# Patient Record
Sex: MALE | Race: WHITE | Employment: OTHER | URBAN - METROPOLITAN AREA
[De-identification: names, ages, dates, MRNs, and addresses within clinical notes are randomized per-mention and may not be internally consistent; named-entity substitution may affect disease eponyms.]

---

## 2017-01-01 ENCOUNTER — TRANSFERRED RECORDS (OUTPATIENT)
Dept: HEALTH INFORMATION MANAGEMENT | Facility: CLINIC | Age: 82
End: 2017-01-01

## 2017-01-01 ENCOUNTER — NURSE TRIAGE (OUTPATIENT)
Dept: NURSING | Facility: CLINIC | Age: 82
End: 2017-01-01

## 2017-01-01 ENCOUNTER — OFFICE VISIT (OUTPATIENT)
Dept: FAMILY MEDICINE | Facility: CLINIC | Age: 82
End: 2017-01-01
Payer: COMMERCIAL

## 2017-01-01 VITALS
SYSTOLIC BLOOD PRESSURE: 139 MMHG | RESPIRATION RATE: 22 BRPM | HEART RATE: 69 BPM | TEMPERATURE: 97.5 F | OXYGEN SATURATION: 98 % | DIASTOLIC BLOOD PRESSURE: 82 MMHG

## 2017-01-01 VITALS
HEART RATE: 83 BPM | OXYGEN SATURATION: 100 % | DIASTOLIC BLOOD PRESSURE: 84 MMHG | RESPIRATION RATE: 12 BRPM | BODY MASS INDEX: 24.17 KG/M2 | TEMPERATURE: 98.7 F | SYSTOLIC BLOOD PRESSURE: 136 MMHG | WEIGHT: 152 LBS

## 2017-01-01 DIAGNOSIS — C61 MALIGNANT NEOPLASM OF PROSTATE (H): Primary | ICD-10-CM

## 2017-01-01 DIAGNOSIS — I50.42 CHRONIC COMBINED SYSTOLIC AND DIASTOLIC CONGESTIVE HEART FAILURE (H): ICD-10-CM

## 2017-01-01 DIAGNOSIS — C61 MALIGNANT NEOPLASM OF PROSTATE (H): ICD-10-CM

## 2017-01-01 DIAGNOSIS — I50.9 CONGESTIVE HEART FAILURE, UNSPECIFIED CONGESTIVE HEART FAILURE CHRONICITY, UNSPECIFIED CONGESTIVE HEART FAILURE TYPE: ICD-10-CM

## 2017-01-01 DIAGNOSIS — N18.30 ANEMIA IN STAGE 3 CHRONIC KIDNEY DISEASE (H): ICD-10-CM

## 2017-01-01 DIAGNOSIS — Z23 NEED FOR PROPHYLACTIC VACCINATION AND INOCULATION AGAINST INFLUENZA: Primary | ICD-10-CM

## 2017-01-01 DIAGNOSIS — D63.1 ANEMIA IN STAGE 3 CHRONIC KIDNEY DISEASE (H): ICD-10-CM

## 2017-01-01 DIAGNOSIS — Z23 NEED FOR VACCINATION: ICD-10-CM

## 2017-01-01 DIAGNOSIS — N18.30 CKD (CHRONIC KIDNEY DISEASE) STAGE 3, GFR 30-59 ML/MIN (H): Primary | ICD-10-CM

## 2017-01-01 DIAGNOSIS — I10 BENIGN ESSENTIAL HYPERTENSION: ICD-10-CM

## 2017-01-01 LAB
ANION GAP SERPL CALCULATED.3IONS-SCNC: 7 MMOL/L (ref 3–14)
BUN SERPL-MCNC: 35 MG/DL (ref 7–30)
CALCIUM SERPL-MCNC: 8.8 MG/DL (ref 8.5–10.1)
CHLORIDE SERPL-SCNC: 105 MMOL/L (ref 94–109)
CO2 SERPL-SCNC: 28 MMOL/L (ref 20–32)
CREAT SERPL-MCNC: 1.54 MG/DL (ref 0.66–1.25)
CREAT UR-MCNC: 67 MG/DL
DEPRECATED CALCIDIOL+CALCIFEROL SERPL-MC: 43 UG/L (ref 20–75)
ERYTHROCYTE [DISTWIDTH] IN BLOOD BY AUTOMATED COUNT: 12.8 % (ref 10–15)
GFR SERPL CREATININE-BSD FRML MDRD: 42 ML/MIN/1.7M2
GLUCOSE SERPL-MCNC: 94 MG/DL (ref 70–99)
HCT VFR BLD AUTO: 35.4 % (ref 40–53)
HGB BLD-MCNC: 11.2 G/DL (ref 13.3–17.7)
MCH RBC QN AUTO: 31.2 PG (ref 26.5–33)
MCHC RBC AUTO-ENTMCNC: 31.6 G/DL (ref 31.5–36.5)
MCV RBC AUTO: 99 FL (ref 78–100)
MICROALBUMIN UR-MCNC: 7 MG/L
MICROALBUMIN/CREAT UR: 9.78 MG/G CR (ref 0–17)
PLATELET # BLD AUTO: 245 10E9/L (ref 150–450)
POTASSIUM SERPL-SCNC: 4.7 MMOL/L (ref 3.4–5.3)
PSA SERPL-MCNC: 0.1 UG/L (ref 0–4)
PSA SERPL-MCNC: 0.14 UG/L (ref 0–4)
RBC # BLD AUTO: 3.59 10E12/L (ref 4.4–5.9)
SODIUM SERPL-SCNC: 140 MMOL/L (ref 133–144)
WBC # BLD AUTO: 8.3 10E9/L (ref 4–11)

## 2017-01-01 PROCEDURE — 82043 UR ALBUMIN QUANTITATIVE: CPT | Performed by: FAMILY MEDICINE

## 2017-01-01 PROCEDURE — 90670 PCV13 VACCINE IM: CPT | Performed by: FAMILY MEDICINE

## 2017-01-01 PROCEDURE — 85027 COMPLETE CBC AUTOMATED: CPT | Performed by: FAMILY MEDICINE

## 2017-01-01 PROCEDURE — 99214 OFFICE O/P EST MOD 30 MIN: CPT | Mod: 25 | Performed by: FAMILY MEDICINE

## 2017-01-01 PROCEDURE — 82306 VITAMIN D 25 HYDROXY: CPT | Performed by: FAMILY MEDICINE

## 2017-01-01 PROCEDURE — 90471 IMMUNIZATION ADMIN: CPT | Performed by: FAMILY MEDICINE

## 2017-01-01 PROCEDURE — 80048 BASIC METABOLIC PNL TOTAL CA: CPT | Performed by: FAMILY MEDICINE

## 2017-01-01 PROCEDURE — 90662 IIV NO PRSV INCREASED AG IM: CPT | Performed by: FAMILY MEDICINE

## 2017-01-01 PROCEDURE — 36415 COLL VENOUS BLD VENIPUNCTURE: CPT | Performed by: UROLOGY

## 2017-01-01 PROCEDURE — 36415 COLL VENOUS BLD VENIPUNCTURE: CPT | Performed by: FAMILY MEDICINE

## 2017-01-01 PROCEDURE — 84153 ASSAY OF PSA TOTAL: CPT | Performed by: UROLOGY

## 2017-01-01 PROCEDURE — 90715 TDAP VACCINE 7 YRS/> IM: CPT | Performed by: FAMILY MEDICINE

## 2017-01-01 PROCEDURE — 99213 OFFICE O/P EST LOW 20 MIN: CPT | Mod: 25 | Performed by: FAMILY MEDICINE

## 2017-01-01 RX ORDER — METOPROLOL TARTRATE 25 MG/1
12.5 TABLET, FILM COATED ORAL 2 TIMES DAILY
Qty: 180 TABLET | Refills: 2 | COMMUNITY
Start: 2017-01-01 | End: 2017-01-01

## 2017-01-01 RX ORDER — METOPROLOL TARTRATE 25 MG/1
TABLET, FILM COATED ORAL
Qty: 180 TABLET | Refills: 2 | Status: SHIPPED | OUTPATIENT
Start: 2017-01-01 | End: 2017-01-01

## 2017-01-01 RX ORDER — METOPROLOL TARTRATE 25 MG/1
12.5 TABLET, FILM COATED ORAL 2 TIMES DAILY
Qty: 90 TABLET | Refills: 3 | Status: ON HOLD | OUTPATIENT
Start: 2017-01-01 | End: 2018-01-01

## 2017-01-01 ASSESSMENT — PAIN SCALES - GENERAL
PAINLEVEL: NO PAIN (0)
PAINLEVEL: NO PAIN (0)

## 2017-03-01 ENCOUNTER — OFFICE VISIT (OUTPATIENT)
Dept: FAMILY MEDICINE | Facility: CLINIC | Age: 82
End: 2017-03-01
Payer: COMMERCIAL

## 2017-03-01 VITALS
TEMPERATURE: 97.6 F | RESPIRATION RATE: 20 BRPM | OXYGEN SATURATION: 99 % | WEIGHT: 154.7 LBS | BODY MASS INDEX: 24.6 KG/M2 | DIASTOLIC BLOOD PRESSURE: 78 MMHG | HEART RATE: 75 BPM | SYSTOLIC BLOOD PRESSURE: 132 MMHG

## 2017-03-01 DIAGNOSIS — C61 MALIGNANT NEOPLASM OF PROSTATE (H): ICD-10-CM

## 2017-03-01 DIAGNOSIS — I25.10 CORONARY ARTERY DISEASE INVOLVING NATIVE CORONARY ARTERY OF NATIVE HEART WITHOUT ANGINA PECTORIS: Primary | ICD-10-CM

## 2017-03-01 DIAGNOSIS — I34.0 MITRAL VALVE INSUFFICIENCY, UNSPECIFIED ETIOLOGY: ICD-10-CM

## 2017-03-01 DIAGNOSIS — I50.42 CHRONIC COMBINED SYSTOLIC AND DIASTOLIC CONGESTIVE HEART FAILURE (H): ICD-10-CM

## 2017-03-01 LAB — PSA SERPL-MCNC: 0.11 UG/L (ref 0–4)

## 2017-03-01 PROCEDURE — 36415 COLL VENOUS BLD VENIPUNCTURE: CPT | Performed by: UROLOGY

## 2017-03-01 PROCEDURE — 99214 OFFICE O/P EST MOD 30 MIN: CPT | Performed by: FAMILY MEDICINE

## 2017-03-01 PROCEDURE — 84153 ASSAY OF PSA TOTAL: CPT | Performed by: UROLOGY

## 2017-03-01 ASSESSMENT — PAIN SCALES - GENERAL: PAINLEVEL: NO PAIN (0)

## 2017-03-01 NOTE — NURSING NOTE
"Chief Complaint   Patient presents with     Hypertension     recheck.        Initial /78 (BP Location: Right arm, Patient Position: Chair, Cuff Size: Adult Regular)  Pulse 75  Temp 97.6  F (36.4  C) (Temporal)  Resp 20  Wt 154 lb 11.2 oz (70.2 kg)  SpO2 99%  BMI 24.6 kg/m2 Estimated body mass index is 24.6 kg/(m^2) as calculated from the following:    Height as of 7/22/16: 5' 6.5\" (1.689 m).    Weight as of this encounter: 154 lb 11.2 oz (70.2 kg).  Medication Reconciliation: complete   Loretta Mina CMA     "

## 2017-03-01 NOTE — MR AVS SNAPSHOT
"              After Visit Summary   3/1/2017    Tristen Canela    MRN: [id number]           Patient Information     Date Of Birth          1/20/1925        Visit Information        Provider Department      3/1/2017 1:00 PM Luis Miguel Watson MD [facility]        Today's Diagnoses     Coronary artery disease involving native coronary artery of native heart without angina pectoris    -  1    Chronic combined systolic and diastolic congestive heart failure (H)        Mitral valve insufficiency, unspecified etiology           Follow-ups after your visit        Your next 10 appointments already scheduled     Jul 05, 2017  1:00 PM CDT   SHORT with Luis Miguel Watson MD   [facility] ([facility]    [address] [place] [id number] [phone]              Who to contact     If you have questions or need follow up information about today's clinic visit or your schedule please contact [facility] directly at [phone].  Normal or non-critical lab and imaging results will be communicated to you by MyChart, letter or phone within 4 business days after the clinic has received the results. If you do not hear from us within 7 days, please contact the clinic through [employer] or phone. If you have a critical or abnormal lab result, we will notify you by phone as soon as possible.  Submit refill requests through [employer] or call your pharmacy and they will forward the refill request to us. Please allow 3 business days for your refill to be completed.          Additional Information About Your Visit        [employer] Education Information     [employer] lets you send messages to your doctor, view your test results, renew your prescriptions, schedule appointments and more. To sign up, go to [email or web address] . Click on \"Log in\" on the left side of the screen, which will take you to the Welcome page. Then click on \"Sign up Now\" on the right side of the " page.     You will be asked to enter the access code listed below, as well as some personal information. Please follow the directions to create your username and password.     Your access code is: [email or web address]  Expires: 2017  4:43 PM     Your access code will  in 90 days. If you need help or a new code, please call your [place] clinic or [phone].        Care EveryWhere ID     This is your Care EveryWhere ID. This could be used by other organizations to access your [facility] medical records  [id number]        Your Vitals Were     Pulse Temperature Respirations Pulse Oximetry BMI (Body Mass Index)       75 97.6  F (36.4  C) (Temporal) 20 99% 24.6 kg/m2        Blood Pressure from Last 3 Encounters:   17 132/78   16 112/68   16 104/60    Weight from Last 3 Encounters:   17 154 lb 11.2 oz (70.2 kg)   16 152 lb 9.6 oz (69.2 kg)   16 150 lb (68 kg)              Today, you had the following     No orders found for display         Today's Medication Changes          These changes are accurate as of: 3/1/17  4:43 PM.  If you have any questions, ask your nurse or doctor.               These medicines have changed or have updated prescriptions.        Dose/Directions    aspirin 81 MG EC tablet   This may have changed:  See the new instructions.   Used for:  Coronary artery disease involving native coronary artery of native heart without angina pectoris   Changed by:  Luis Miguel Watson MD        Dose:  325 mg   Take 4 tablets (325 mg) by mouth daily   Quantity:  90 tablet   Refills:  1                Primary Care Provider Office Phone # Fax #    Luis Miguel Watson [phone]       [address] [facility] [place] [address]        Thank you!     Thank you for choosing [facility]  for your care. Our goal is always to provide you with excellent care. Hearing back from our patients is one way we can continue to improve  our services. Please take a few minutes to complete the written survey that you may receive in the mail after your visit with us. Thank you!             Your Updated Medication List - Protect others around you: Learn how to safely use, store and throw away your medicines at www.disposemymeds.org.          This list is accurate as of: 3/1/17  4:43 PM.  Always use your most recent med list.                   Brand Name Dispense Instructions for use    aspirin 81 MG EC tablet     90 tablet    Take 4 tablets (325 mg) by mouth daily       lisinopril 5 MG tablet    PRINIVIL/ZESTRIL    45 tablet    Take 0.5 tablets (2.5 mg) by mouth daily       LUPRON 5 MG/ML Soln   Generic drug:  Leuprolide Acetate     1 mL    1 SHOT PER DR LARA EVERY FOUR MONTHS FOR PROSTATE CANCER       metoprolol 25 MG tablet    LOPRESSOR    180 tablet    Take 0.5 tablets (12.5 mg) by mouth 2 times daily       Multi-vitamin Tabs tablet   Generic drug:  multivitamin, therapeutic with minerals      Take 1 tablet by mouth daily.

## 2017-03-01 NOTE — PROGRESS NOTES
SUBJECTIVE:                                                    Tristen Canela is a 89 year old male who presents to clinic today for the following health issues:      Hypertension Follow-up      Outpatient blood pressures are not being checked.    Low Salt Diet: no added salt       Amount of exercise or physical activity: 6-7 days/week for an average of less than 15 minutes    Problems taking medications regularly: No    Medication side effects: none  Diet: low salt    CHF-continue to watch weights daily and reduce his sodium intake. Doing well. No functional decline. Still up and moving about without limitation, except for his age and chronic disability. He uses a walker mainly.    CAD-no chest pain. Compliant with medication regimen        Problem list and histories reviewed & adjusted, as indicated.  Additional history: as documented        Reviewed and updated as needed this visit by clinical staff  Tobacco  Allergies  Med Hx  Surg Hx  Fam Hx  Soc Hx      Reviewed and updated as needed this visit by Provider         ROS:  Constitutional, HEENT, cardiovascular, pulmonary, gi and gu systems are negative, except as otherwise noted.    OBJECTIVE:                                                    /78 (BP Location: Right arm, Patient Position: Chair, Cuff Size: Adult Regular)  Pulse 75  Temp 97.6  F (36.4  C) (Temporal)  Resp 20  Wt 154 lb 11.2 oz (70.2 kg)  SpO2 99%  BMI 24.6 kg/m2  Body mass index is 24.6 kg/(m^2).  GENERAL: healthy, alert and no distress  NECK: no adenopathy, no asymmetry, masses, or scars and thyroid normal to palpation  RESP: lungs clear to auscultation - no rales, rhonchi or wheezes  CV: regular rate and rhythm, normal S1 S2, no S3 or S4, 3/6 systolic flow murmur, click or rub, no peripheral edema and peripheral pulses strong  ABDOMEN: soft, nontender, no hepatosplenomegaly, no masses and bowel sounds normal  MS: no gross musculoskeletal defects noted, no edema    Diagnostic  Test Results:  none      ASSESSMENT/PLAN:                                                              ICD-10-CM    1. Coronary artery disease involving native coronary artery of native heart without angina pectoris I25.10 aspirin EC 81 MG EC tablet   2. Chronic combined systolic and diastolic congestive heart failure (H) I50.42    3. Mitral valve insufficiency, unspecified etiology I34.0        Euvolemic today. No signs of heart failure exacerbation. No signs of decline. Still maintaining good functioning for his age. Continue with beta blocker, Ace, aspirin. Declines cholesterol treatment. At his age, the benefits are questionable regarding this. Continue to follow his mitral valve symptomatically. Do not wish aggressive interventions for this.     Luis Miguel Watson MD  [facility]

## 2017-03-06 ENCOUNTER — TRANSFERRED RECORDS (OUTPATIENT)
Dept: HEALTH INFORMATION MANAGEMENT | Facility: CLINIC | Age: 82
End: 2017-03-06

## 2017-03-06 DIAGNOSIS — C61 MALIGNANT NEOPLASM OF PROSTATE (H): Primary | ICD-10-CM

## 2017-07-12 NOTE — PROGRESS NOTES
SUBJECTIVE:                                                    Tristen Canela is a 89 year old male who presents to clinic today for the following health issues:    Chief Complaint   Patient presents with     Hypertension        Hypertension Follow-up      Outpatient blood pressures are being checked at home.  Results are 140/80.    Low Salt Diet: no added salt      Amount of exercise or physical activity: 2-3 days/week for an average of 30-45 minutes    Problems taking medications regularly: No    Medication side effects: none    Diet: low salt    Return to check chronic disease. Has been doing well. Still remains quite active mowing lawns. Family is here and agrees. Avoids NSAIDs. Has chronic kidney disease. No evidence of worsening heart failure. Decent activity tolerance for age.    ROS:  Constitutional, HEENT, cardiovascular, pulmonary, gi and gu systems are negative, except as otherwise noted.    OBJECTIVE:                                                    /82 (BP Location: Right arm, Patient Position: Chair, Cuff Size: Adult Regular)  Pulse 69  Temp 97.5  F (36.4  C) (Temporal)  Resp 22  SpO2 98%  There is no height or weight on file to calculate BMI.    GENERAL: healthy, alert and no distress  NECK: no adenopathy, no asymmetry, masses, or scars and thyroid normal to palpation  RESP: lungs clear to auscultation - no rales, rhonchi or wheezes  CV: regular rate and rhythm, normal S1 S2, no S3 or S4, 3/6 systolic flow murmur, click or rub, no peripheral edema and peripheral pulses strong  ABDOMEN: soft, nontender, no hepatosplenomegaly, no masses and bowel sounds normal  MS: no gross musculoskeletal defects noted, no edema       Diagnostic Test Results:  none      ASSESSMENT/PLAN:                                                        ICD-10-CM    1. CKD (chronic kidney disease) stage 3, GFR 30-59 ml/min N18.3 Basic metabolic panel     Albumin Random Urine Quantitative     CBC with platelets      Vitamin D Deficiency   2. Chronic combined systolic and diastolic congestive heart failure (H) I50.42    3. Benign essential hypertension I10    4. Anemia in stage 3 chronic kidney disease N18.3     D63.1    5. Need for vaccination Z23 TDAP VACCINE (ADACEL) [54653.002]     Pneumococcal vaccine 13 valent PCV13 IM (Prevnar) [72628]     ADMIN: Vaccine, Initial (82901)       Stable chronic diseases. Recheck baseline labs. No changes today made to his medication regimen. Update of vaccinations. See him back in 4 months.    Luis Miguel Watson MD  [facility]

## 2017-07-12 NOTE — MR AVS SNAPSHOT
"              After Visit Summary   7/12/2017    Tristen Canela    MRN: [id number]           Patient Information     Date Of Birth          1/20/1925        Visit Information        Provider Department      7/12/2017 9:40 AM Luis Miguel Watson MD [facility]        Today's Diagnoses     CKD (chronic kidney disease) stage 3, GFR 30-59 ml/min    -  1    Chronic combined systolic and diastolic congestive heart failure (H)        Benign essential hypertension        Anemia in stage 3 chronic kidney disease        Need for vaccination           Follow-ups after your visit        Your next 10 appointments already scheduled     Nov 07, 2017  9:40 AM CST   SHORT with Luis Miguel Watson MD   [facility] ([facility]    [address] [place] [address] [phone]              Who to contact     If you have questions or need follow up information about today's clinic visit or your schedule please contact [facility] directly at [phone].  Normal or non-critical lab and imaging results will be communicated to you by MyChart, letter or phone within 4 business days after the clinic has received the results. If you do not hear from us within 7 days, please contact the clinic through [employer] or phone. If you have a critical or abnormal lab result, we will notify you by phone as soon as possible.  Submit refill requests through [employer] or call your pharmacy and they will forward the refill request to us. Please allow 3 business days for your refill to be completed.          Additional Information About Your Visit        [employer] Information     [employer] lets you send messages to your doctor, view your test results, renew your prescriptions, schedule appointments and more. To sign up, go to [email or web address] . Click on \"Log in\" on the left side of the screen, which will take you to the Welcome page. Then click on \"Sign up Now\" on the right " side of the page.     You will be asked to enter the access code listed below, as well as some personal information. Please follow the directions to create your username and password.     Your access code is: [email or web address]  Expires: 10/10/2017  5:30 PM     Your access code will  in 90 days. If you need help or a new code, please call your [place] clinic or [phone].        Care EveryWhere ID     This is your Care EveryWhere ID. This could be used by other organizations to access your [facility] medical records  [id number]        Your Vitals Were     Pulse Temperature Respirations Pulse Oximetry          69 97.5  F (36.4  C) (Temporal) 22 98%         Blood Pressure from Last 3 Encounters:   17 139/82   17 132/78   16 112/68    Weight from Last 3 Encounters:   17 154 lb 11.2 oz (70.2 kg)   16 152 lb 9.6 oz (69.2 kg)   16 150 lb (68 kg)              We Performed the Following     ADMIN: Vaccine, Initial (28721)     Albumin Random Urine Quantitative     Basic metabolic panel     CBC with platelets     Pneumococcal vaccine 13 valent PCV13 IM (Prevnar) [90354]     TDAP VACCINE (ADACEL) [60380.002]     Vitamin D Deficiency        Primary Care Provider Office Phone # Fax #    Robbjohana Franck Watson [phone]       [address]   [facility] [address]        Equal Access to Services     ANG MAYS AH: Hadii aad ku hadasho Soomaali, waaxda luqadaha, qaybta kaalmada adeegyada, lloyd corcoran. So [facility] [phone].    ATENCIÓN: Si habla español, tiene a frank disposición servicios gratuitos de asistencia lingüística. Llame al [phone].    We comply with applicable federal civil rights laws and [place] laws. We do not discriminate on the basis of race, color, national origin, age, disability sex, sexual orientation or gender identity.            Thank you!     Thank you for choosing [facility]   for your care. Our goal is always to provide you with excellent care. Hearing back from our patients is one way we can continue to improve our services. Please take a few minutes to complete the written survey that you may receive in the mail after your visit with us. Thank you!             Your Updated Medication List - Protect others around you: Learn how to safely use, store and throw away your medicines at www.disposemymeds.org.          This list is accurate as of: 7/12/17  5:30 PM.  Always use your most recent med list.                   Brand Name Dispense Instructions for use Diagnosis    aspirin 81 MG EC tablet     90 tablet    Take 81 mg by mouth daily    Coronary artery disease involving native coronary artery of native heart without angina pectoris       lisinopril 5 MG tablet    PRINIVIL/ZESTRIL    45 tablet    TAKE ONE-HALF TABLET BY MOUTH ONCE DAILY    Benign essential hypertension       LUPRON 5 MG/ML Soln   Generic drug:  Leuprolide Acetate     1 mL    1 SHOT PER DR LARA EVERY FOUR MONTHS FOR PROSTATE CANCER    Prostate cancer (H)       metoprolol 25 MG tablet    LOPRESSOR    180 tablet    Take 0.5 tablets (12.5 mg) by mouth 2 times daily    Congestive heart failure, unspecified congestive heart failure chronicity, unspecified congestive heart failure type (H)       Multi-vitamin Tabs tablet   Generic drug:  multivitamin, therapeutic with minerals      Take 1 tablet by mouth daily.

## 2017-07-12 NOTE — NURSING NOTE
"Chief Complaint   Patient presents with     Hypertension       Initial /82 (BP Location: Right arm, Patient Position: Chair, Cuff Size: Adult Regular)  Pulse 69  Temp 97.5  F (36.4  C) (Temporal)  Resp 22  SpO2 98% Estimated body mass index is 24.6 kg/(m^2) as calculated from the following:    Height as of 7/22/16: 5' 6.5\" (1.689 m).    Weight as of 3/1/17: 154 lb 11.2 oz (70.2 kg).  Medication Reconciliation: complete   Loretta Mina CMA     "

## 2017-10-09 NOTE — TELEPHONE ENCOUNTER
metoprolol (LOPRESSOR) 25 MG tablet      Last Written Prescription Date: 6/17/16  Last Fill Quantity: 180, # refills: 3    Last Office Visit with [facility], [facility] or [facility] prescribing provider:  7/12/17   Future Office Visit:    Next 5 appointments (look out 90 days)     Nov 07, 2017  9:40 AM CST   SHORT with Luis Miguel Watson MD   [facility] ([facility])    [address] [place] [address] [phone]                    BP Readings from Last 3 Encounters:   07/12/17 139/82   03/01/17 132/78   11/25/16 112/68

## 2017-11-21 NOTE — NURSING NOTE
"Chief Complaint   Patient presents with     Hypertension     recheck       Initial /84  Pulse 83  Temp 98.7  F (37.1  C) (Tympanic)  Resp 12  Wt 152 lb (68.9 kg)  SpO2 100%  BMI 24.17 kg/m2 Estimated body mass index is 24.17 kg/(m^2) as calculated from the following:    Height as of 7/22/16: 5' 6.5\" (1.689 m).    Weight as of this encounter: 152 lb (68.9 kg).  Medication Reconciliation: complete    "

## 2017-11-21 NOTE — PROGRESS NOTES

## 2017-11-21 NOTE — PROGRESS NOTES
SUBJECTIVE:                                                    Tristen Canela is a 89 year old male who presents to clinic today for the following health issues:    Chief Complaint   Patient presents with     Hypertension     recheck        Hypertension Follow-up      Outpatient blood pressures are not being checked.    Low Salt Diet: not monitoring salt        Amount of exercise or physical activity: None    Problems taking medications regularly: No    Medication side effects: none    Diet: regular (no restrictions)          ROS:  Constitutional, HEENT, cardiovascular, pulmonary, gi and gu systems are negative, except as otherwise noted.      OBJECTIVE:                                                    /84  Pulse 83  Temp 98.7  F (37.1  C) (Tympanic)  Resp 12  Wt 68.9 kg (152 lb)  SpO2 100%  BMI 24.17 kg/m2  Body mass index is 24.17 kg/(m^2).    GENERAL: healthy, alert and no distress  NECK: no adenopathy, no asymmetry, masses, or scars and thyroid normal to palpation  RESP: lungs clear to auscultation - no rales, rhonchi or wheezes  CV: regular rate and rhythm, normal S1 S2, no S3 or S4, no murmur, click or rub, no peripheral edema and peripheral pulses strong  ABDOMEN: soft, nontender, no hepatosplenomegaly, no masses and bowel sounds normal  MS: no gross musculoskeletal defects noted, no edema    Diagnostic Test Results:  none      ASSESSMENT/PLAN:                                                        ICD-10-CM    1. Need for prophylactic vaccination and inoculation against influenza Z23 FLU VACCINE, INCREASED ANTIGEN, PRESV FREE, AGE 65+ [36606]     Vaccine Administration, Initial [56098]   2. Congestive heart failure, unspecified congestive heart failure chronicity, unspecified congestive heart failure type (H) I50.9 metoprolol (LOPRESSOR) 25 MG tablet     DISCONTINUED: metoprolol (LOPRESSOR) 25 MG tablet       Stable hypertension. No changes. Continue on beta blocker and Ace for his history  of hypertension or heart failure. Continue home monitoring and see me back in 3-4 months    Luis Miguel Watson MD  [facility]

## 2017-11-21 NOTE — MR AVS SNAPSHOT
"              After Visit Summary   2017    Tristen Canela    MRN: [id number]           Patient Information     Date Of Birth          1925        Visit Information        Provider Department      2017 1:00 PM Luis Miguel Watson MD [facility]        Today's Diagnoses     Need for prophylactic vaccination and inoculation against influenza    -  1    Congestive heart failure, unspecified congestive heart failure chronicity, unspecified congestive heart failure type (H)           Follow-ups after your visit        Who to contact     If you have questions or need follow up information about today's clinic visit or your schedule please contact [facility] directly at [phone].  Normal or non-critical lab and imaging results will be communicated to you by MyChart, letter or phone within 4 business days after the clinic has received the results. If you do not hear from us within 7 days, please contact the clinic through MyChart or phone. If you have a critical or abnormal lab result, we will notify you by phone as soon as possible.  Submit refill requests through PresenterNet or call your pharmacy and they will forward the refill request to us. Please allow 3 business days for your refill to be completed.          Additional Information About Your Visit        MyChart Information     PresenterNet lets you send messages to your doctor, view your test results, renew your prescriptions, schedule appointments and more. To sign up, go to [email or web address] . Click on \"Log in\" on the left side of the screen, which will take you to the Welcome page. Then click on \"Sign up Now\" on the right side of the page.     You will be asked to enter the access code listed below, as well as some personal information. Please follow the directions to create your username and password.     Your access code is: [email or web address]  Expires: 2018  4:22 PM     Your access code will  in 90 " days. If you need help or a new code, please call your [place] clinic or [phone].        Care EveryWhere ID     This is your Care EveryWhere ID. This could be used by other organizations to access your [place] medical records  [id number]        Your Vitals Were     Pulse Temperature Respirations Pulse Oximetry BMI (Body Mass Index)       83 98.7  F (37.1  C) (Tympanic) 12 100% 24.17 kg/m2        Blood Pressure from Last 3 Encounters:   11/21/17 136/84   07/12/17 139/82   03/01/17 132/78    Weight from Last 3 Encounters:   11/21/17 152 lb (68.9 kg)   03/01/17 154 lb 11.2 oz (70.2 kg)   11/25/16 152 lb 9.6 oz (69.2 kg)              We Performed the Following     FLU VACCINE, INCREASED ANTIGEN, PRESV FREE, AGE 65+ [52881]     Vaccine Administration, Initial [81611]          Today's Medication Changes          These changes are accurate as of: 11/21/17 11:59 PM.  If you have any questions, ask your nurse or doctor.               Start taking these medicines.        Dose/Directions    metoprolol 25 MG tablet   Commonly known as:  LOPRESSOR   Used for:  Congestive heart failure, unspecified congestive heart failure chronicity, unspecified congestive heart failure type (H)   Started by:  Luis Miguel Watson MD        Dose:  12.5 mg   Take 0.5 tablets (12.5 mg) by mouth 2 times daily   Quantity:  90 tablet   Refills:  3            Where to get your medicines      These medications were sent to [place] [address]  [address] [facility] [address]     Phone:  [phone]     metoprolol 25 MG tablet                Primary Care Provider Office Phone # Fax #    Luis Miguel Watson [phone]       [address] [place] [address]        Equal Access to Services     ANG MAYS AH: Dominic conti Socrystal, waaxda luqadaha, qaybta kaalmada adeegyakvng, lloyd corcoran. [facility] [phone].    ATENCIÓN: Si luis mcdonald  a frank disposición servicios gratuitos de asistencia lingüística. Simeon valdez [phone].    We comply with applicable federal civil rights laws and [place] laws. We do not discriminate on the basis of race, color, national origin, age, disability, sex, sexual orientation, or gender identity.            Thank you!     Thank you for choosing [facility]  for your care. Our goal is always to provide you with excellent care. Hearing back from our patients is one way we can continue to improve our services. Please take a few minutes to complete the written survey that you may receive in the mail after your visit with us. Thank you!             Your Updated Medication List - Protect others around you: Learn how to safely use, store and throw away your medicines at www.disposemymeds.org.          This list is accurate as of: 11/21/17 11:59 PM.  Always use your most recent med list.                   Brand Name Dispense Instructions for use Diagnosis    aspirin 81 MG EC tablet     90 tablet    Take 81 mg by mouth daily    Coronary artery disease involving native coronary artery of native heart without angina pectoris       lisinopril 5 MG tablet    PRINIVIL/ZESTRIL    45 tablet    TAKE ONE-HALF TABLET BY MOUTH ONCE DAILY    Benign essential hypertension       LUPRON 5 MG/ML Soln   Generic drug:  Leuprolide Acetate     1 mL    1 SHOT PER DR LARA EVERY FOUR MONTHS FOR PROSTATE CANCER    Prostate cancer (H)       metoprolol 25 MG tablet    LOPRESSOR    90 tablet    Take 0.5 tablets (12.5 mg) by mouth 2 times daily    Congestive heart failure, unspecified congestive heart failure chronicity, unspecified congestive heart failure type (H)       Multi-vitamin Tabs tablet   Generic drug:  multivitamin, therapeutic with minerals      Take 1 tablet by mouth daily.

## 2017-11-21 NOTE — NURSING NOTE
Prior to injection verified patient identity using patient's name and date of birth.   Patient instructed to remain in clinic for 20 minutes afterwards, and to report any adverse reaction to me immediately.  Tiffani Pitts MA

## 2017-11-24 NOTE — TELEPHONE ENCOUNTER
"\"He took my mother's medications by accident, she dispensed them, go them mixed up and he ended up taking them.  He took 20mg of Lisinopril, 30mg of Imdur, and a calcium tablet.  He normally only takes 12.5mg of Metoprolol.  His BP now is 111/65 and is not showing any symptoms.\"  Writer gave daughter the number for poison control and then transferred the call.    Reason for Disposition    Took another person's prescription drug    Protocols used: MEDICATION QUESTION CALL-ADULT-    "

## 2018-01-01 ENCOUNTER — PATIENT OUTREACH (OUTPATIENT)
Dept: CARE COORDINATION | Facility: CLINIC | Age: 83
End: 2018-01-01

## 2018-01-01 ENCOUNTER — ANESTHESIA EVENT (OUTPATIENT)
Dept: INTENSIVE CARE | Facility: CLINIC | Age: 83
DRG: 862 | End: 2018-01-01
Payer: MEDICARE

## 2018-01-01 ENCOUNTER — ANESTHESIA (OUTPATIENT)
Dept: SURGERY | Facility: CLINIC | Age: 83
DRG: 862 | End: 2018-01-01
Payer: MEDICARE

## 2018-01-01 ENCOUNTER — APPOINTMENT (OUTPATIENT)
Dept: GENERAL RADIOLOGY | Facility: CLINIC | Age: 83
DRG: 862 | End: 2018-01-01
Attending: HOSPITALIST
Payer: MEDICARE

## 2018-01-01 ENCOUNTER — OFFICE VISIT (OUTPATIENT)
Dept: ORTHOPEDICS | Facility: CLINIC | Age: 83
End: 2018-01-01
Payer: COMMERCIAL

## 2018-01-01 ENCOUNTER — ANESTHESIA EVENT (OUTPATIENT)
Dept: SURGERY | Facility: CLINIC | Age: 83
DRG: 862 | End: 2018-01-01
Payer: MEDICARE

## 2018-01-01 ENCOUNTER — HOSPITAL ENCOUNTER (OUTPATIENT)
Facility: CLINIC | Age: 83
Setting detail: OBSERVATION
Discharge: NURSING, REHAB OR LONG-TERM CARE | End: 2018-05-19
Attending: EMERGENCY MEDICINE | Admitting: INTERNAL MEDICINE
Payer: MEDICARE

## 2018-01-01 ENCOUNTER — INFUSION THERAPY VISIT (OUTPATIENT)
Dept: INFUSION THERAPY | Facility: CLINIC | Age: 83
End: 2018-01-01
Attending: FAMILY MEDICINE
Payer: MEDICARE

## 2018-01-01 ENCOUNTER — TRANSFERRED RECORDS (OUTPATIENT)
Dept: HEALTH INFORMATION MANAGEMENT | Facility: CLINIC | Age: 83
End: 2018-01-01

## 2018-01-01 ENCOUNTER — NURSING HOME VISIT (OUTPATIENT)
Dept: GERIATRICS | Facility: CLINIC | Age: 83
End: 2018-01-01
Payer: COMMERCIAL

## 2018-01-01 ENCOUNTER — HOSPITAL ENCOUNTER (INPATIENT)
Facility: CLINIC | Age: 83
LOS: 1 days | DRG: 862 | End: 2018-06-04
Attending: UROLOGY | Admitting: HOSPITALIST
Payer: MEDICARE

## 2018-01-01 ENCOUNTER — OFFICE VISIT (OUTPATIENT)
Dept: FAMILY MEDICINE | Facility: CLINIC | Age: 83
End: 2018-01-01
Payer: COMMERCIAL

## 2018-01-01 ENCOUNTER — APPOINTMENT (OUTPATIENT)
Dept: MRI IMAGING | Facility: CLINIC | Age: 83
End: 2018-01-01
Attending: ORTHOPAEDIC SURGERY
Payer: MEDICARE

## 2018-01-01 ENCOUNTER — APPOINTMENT (OUTPATIENT)
Dept: GENERAL RADIOLOGY | Facility: CLINIC | Age: 83
End: 2018-01-01
Attending: EMERGENCY MEDICINE
Payer: MEDICARE

## 2018-01-01 ENCOUNTER — HOSPITAL LABORATORY (OUTPATIENT)
Dept: NURSING HOME | Facility: OTHER | Age: 83
End: 2018-01-01

## 2018-01-01 ENCOUNTER — SURGERY (OUTPATIENT)
Age: 83
End: 2018-01-01

## 2018-01-01 ENCOUNTER — ANESTHESIA (OUTPATIENT)
Dept: INTENSIVE CARE | Facility: CLINIC | Age: 83
DRG: 862 | End: 2018-01-01
Payer: MEDICARE

## 2018-01-01 ENCOUNTER — APPOINTMENT (OUTPATIENT)
Dept: PHYSICAL THERAPY | Facility: CLINIC | Age: 83
End: 2018-01-01
Payer: MEDICARE

## 2018-01-01 ENCOUNTER — APPOINTMENT (OUTPATIENT)
Dept: PHYSICAL THERAPY | Facility: CLINIC | Age: 83
End: 2018-01-01
Attending: INTERNAL MEDICINE
Payer: MEDICARE

## 2018-01-01 ENCOUNTER — TELEPHONE (OUTPATIENT)
Dept: FAMILY MEDICINE | Facility: CLINIC | Age: 83
End: 2018-01-01

## 2018-01-01 ENCOUNTER — APPOINTMENT (OUTPATIENT)
Dept: GENERAL RADIOLOGY | Facility: CLINIC | Age: 83
DRG: 862 | End: 2018-01-01
Attending: INTERNAL MEDICINE
Payer: MEDICARE

## 2018-01-01 VITALS
RESPIRATION RATE: 16 BRPM | BODY MASS INDEX: 23.34 KG/M2 | HEIGHT: 67 IN | DIASTOLIC BLOOD PRESSURE: 62 MMHG | WEIGHT: 148.7 LBS | SYSTOLIC BLOOD PRESSURE: 110 MMHG | TEMPERATURE: 98.4 F | HEART RATE: 78 BPM

## 2018-01-01 VITALS
TEMPERATURE: 96.2 F | SYSTOLIC BLOOD PRESSURE: 130 MMHG | HEART RATE: 72 BPM | OXYGEN SATURATION: 98 % | DIASTOLIC BLOOD PRESSURE: 70 MMHG

## 2018-01-01 VITALS
SYSTOLIC BLOOD PRESSURE: 118 MMHG | WEIGHT: 146 LBS | HEART RATE: 128 BPM | DIASTOLIC BLOOD PRESSURE: 64 MMHG | RESPIRATION RATE: 18 BRPM | TEMPERATURE: 98.2 F | BODY MASS INDEX: 22.87 KG/M2

## 2018-01-01 VITALS
TEMPERATURE: 98.6 F | HEART RATE: 82 BPM | RESPIRATION RATE: 16 BRPM | BODY MASS INDEX: 22.93 KG/M2 | WEIGHT: 146.4 LBS | DIASTOLIC BLOOD PRESSURE: 54 MMHG | SYSTOLIC BLOOD PRESSURE: 96 MMHG

## 2018-01-01 VITALS
RESPIRATION RATE: 16 BRPM | WEIGHT: 147 LBS | TEMPERATURE: 99 F | DIASTOLIC BLOOD PRESSURE: 55 MMHG | BODY MASS INDEX: 23.02 KG/M2 | SYSTOLIC BLOOD PRESSURE: 98 MMHG | HEART RATE: 90 BPM

## 2018-01-01 VITALS
OXYGEN SATURATION: 96 % | SYSTOLIC BLOOD PRESSURE: 134 MMHG | DIASTOLIC BLOOD PRESSURE: 82 MMHG | HEART RATE: 71 BPM | TEMPERATURE: 98.6 F

## 2018-01-01 VITALS
OXYGEN SATURATION: 93 % | HEIGHT: 67 IN | WEIGHT: 146.61 LBS | DIASTOLIC BLOOD PRESSURE: 71 MMHG | RESPIRATION RATE: 18 BRPM | BODY MASS INDEX: 23.01 KG/M2 | HEART RATE: 99 BPM | TEMPERATURE: 98.5 F | SYSTOLIC BLOOD PRESSURE: 119 MMHG

## 2018-01-01 VITALS
RESPIRATION RATE: 28 BRPM | TEMPERATURE: 97.6 F | DIASTOLIC BLOOD PRESSURE: 63 MMHG | SYSTOLIC BLOOD PRESSURE: 97 MMHG | OXYGEN SATURATION: 28 % | HEART RATE: 109 BPM

## 2018-01-01 VITALS
TEMPERATURE: 97.6 F | HEIGHT: 67 IN | BODY MASS INDEX: 23.07 KG/M2 | DIASTOLIC BLOOD PRESSURE: 60 MMHG | WEIGHT: 147 LBS | SYSTOLIC BLOOD PRESSURE: 100 MMHG

## 2018-01-01 DIAGNOSIS — K11.20 INFECTION OF PAROTID GLAND: Primary | ICD-10-CM

## 2018-01-01 DIAGNOSIS — S83.512D RUPTURE OF ANTERIOR CRUCIATE LIGAMENT OF LEFT KNEE, SUBSEQUENT ENCOUNTER: ICD-10-CM

## 2018-01-01 DIAGNOSIS — N18.30 CKD (CHRONIC KIDNEY DISEASE) STAGE 3, GFR 30-59 ML/MIN (H): ICD-10-CM

## 2018-01-01 DIAGNOSIS — I34.0 MITRAL VALVE INSUFFICIENCY, UNSPECIFIED ETIOLOGY: ICD-10-CM

## 2018-01-01 DIAGNOSIS — N18.30 ANEMIA IN STAGE 3 CHRONIC KIDNEY DISEASE (H): Primary | ICD-10-CM

## 2018-01-01 DIAGNOSIS — M17.12 PRIMARY OSTEOARTHRITIS OF LEFT KNEE: Primary | ICD-10-CM

## 2018-01-01 DIAGNOSIS — S80.02XA CONTUSION OF LEFT KNEE, INITIAL ENCOUNTER: ICD-10-CM
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
## 2018-01-01 DIAGNOSIS — I50.9 CONGESTIVE HEART FAILURE, UNSPECIFIED CONGESTIVE HEART FAILURE CHRONICITY, UNSPECIFIED CONGESTIVE HEART FAILURE TYPE: ICD-10-CM

## 2018-01-01 DIAGNOSIS — I25.10 CORONARY ARTERY DISEASE INVOLVING NATIVE CORONARY ARTERY OF NATIVE HEART WITHOUT ANGINA PECTORIS: Primary | ICD-10-CM

## 2018-01-01 DIAGNOSIS — S72.435D CLOSED NONDISPLACED FRACTURE OF MEDIAL CONDYLE OF LEFT FEMUR WITH ROUTINE HEALING, SUBSEQUENT ENCOUNTER: Primary | ICD-10-CM

## 2018-01-01 DIAGNOSIS — R33.9 URINARY RETENTION: Primary | ICD-10-CM

## 2018-01-01 DIAGNOSIS — Z87.19 HISTORY OF HEMATEMESIS: ICD-10-CM

## 2018-01-01 DIAGNOSIS — D63.1 ANEMIA IN STAGE 3 CHRONIC KIDNEY DISEASE (H): Primary | ICD-10-CM

## 2018-01-01 DIAGNOSIS — I50.42 CHRONIC COMBINED SYSTOLIC AND DIASTOLIC CONGESTIVE HEART FAILURE (H): ICD-10-CM

## 2018-01-01 DIAGNOSIS — I25.10 CORONARY ARTERY DISEASE INVOLVING NATIVE CORONARY ARTERY OF NATIVE HEART WITHOUT ANGINA PECTORIS: ICD-10-CM

## 2018-01-01 DIAGNOSIS — C61 MALIGNANT NEOPLASM OF PROSTATE (H): Primary | ICD-10-CM

## 2018-01-01 DIAGNOSIS — D62 ANEMIA DUE TO BLOOD LOSS, ACUTE: ICD-10-CM

## 2018-01-01 DIAGNOSIS — N18.30 ANEMIA IN STAGE 3 CHRONIC KIDNEY DISEASE (H): ICD-10-CM

## 2018-01-01 DIAGNOSIS — R33.9 URINARY RETENTION: ICD-10-CM

## 2018-01-01 DIAGNOSIS — D62 ANEMIA DUE TO BLOOD LOSS, ACUTE: Primary | ICD-10-CM

## 2018-01-01 DIAGNOSIS — D63.8 ANEMIA IN OTHER CHRONIC DISEASES CLASSIFIED ELSEWHERE: ICD-10-CM

## 2018-01-01 DIAGNOSIS — S72.435D CLOSED NONDISPLACED FRACTURE OF MEDIAL CONDYLE OF LEFT FEMUR WITH ROUTINE HEALING, SUBSEQUENT ENCOUNTER: ICD-10-CM

## 2018-01-01 DIAGNOSIS — I10 BENIGN ESSENTIAL HYPERTENSION: ICD-10-CM

## 2018-01-01 DIAGNOSIS — E61.1 LOW IRON: ICD-10-CM

## 2018-01-01 DIAGNOSIS — C61 MALIGNANT NEOPLASM OF PROSTATE (H): ICD-10-CM

## 2018-01-01 DIAGNOSIS — D63.1 ANEMIA IN STAGE 3 CHRONIC KIDNEY DISEASE (H): ICD-10-CM

## 2018-01-01 DIAGNOSIS — S89.92XA KNEE INJURY, LEFT, INITIAL ENCOUNTER: ICD-10-CM

## 2018-01-01 DIAGNOSIS — E61.1 LOW IRON: Primary | ICD-10-CM

## 2018-01-01 DIAGNOSIS — D64.9 ANEMIA: ICD-10-CM

## 2018-01-01 LAB
ABO + RH BLD: NORMAL
ABO + RH BLD: NORMAL
ALBUMIN SERPL-MCNC: 2.3 G/DL (ref 3.4–5)
ALBUMIN UR-MCNC: 100 MG/DL
ALBUMIN UR-MCNC: NEGATIVE MG/DL
ALP SERPL-CCNC: 129 U/L (ref 40–150)
ALT SERPL W P-5'-P-CCNC: 51 U/L (ref 0–70)
ANION GAP SERPL CALCULATED.3IONS-SCNC: 19 MMOL/L (ref 3–14)
ANION GAP SERPL CALCULATED.3IONS-SCNC: 22 MMOL/L (ref 3–14)
ANION GAP SERPL CALCULATED.3IONS-SCNC: 30 MMOL/L (ref 3–14)
ANION GAP SERPL CALCULATED.3IONS-SCNC: 8 MMOL/L (ref 3–14)
ANION GAP SERPL CALCULATED.3IONS-SCNC: 9 MMOL/L (ref 3–14)
APPEARANCE UR: ABNORMAL
APPEARANCE UR: CLEAR
AST SERPL W P-5'-P-CCNC: 70 U/L (ref 0–45)
BACTERIA #/AREA URNS HPF: ABNORMAL /HPF
BACTERIA SPEC CULT: NO GROWTH
BASE DEFICIT BLDA-SCNC: 19.4 MMOL/L
BASE DEFICIT BLDA-SCNC: 23.7 MMOL/L
BASE DEFICIT BLDA-SCNC: 24.9 MMOL/L
BASOPHILS # BLD AUTO: 0 10E9/L (ref 0–0.2)
BASOPHILS # BLD AUTO: 0 10E9/L (ref 0–0.2)
BASOPHILS NFR BLD AUTO: 0.3 %
BASOPHILS NFR BLD AUTO: 0.3 %
BILIRUB DIRECT SERPL-MCNC: 0.5 MG/DL (ref 0–0.2)
BILIRUB SERPL-MCNC: 0.8 MG/DL (ref 0.2–1.3)
BILIRUB UR QL STRIP: NEGATIVE
BILIRUB UR QL STRIP: NEGATIVE
BLD GP AB SCN SERPL QL: NORMAL
BLD PROD TYP BPU: NORMAL
BLD UNIT ID BPU: 0
BLOOD BANK CMNT PATIENT-IMP: NORMAL
BLOOD PRODUCT CODE: NORMAL
BPU ID: NORMAL
BUN SERPL-MCNC: 35 MG/DL (ref 7–30)
BUN SERPL-MCNC: 38 MG/DL (ref 7–30)
BUN SERPL-MCNC: 41 MG/DL (ref 7–30)
BUN SERPL-MCNC: 42 MG/DL (ref 7–30)
BUN SERPL-MCNC: 42 MG/DL (ref 7–30)
CALCIUM SERPL-MCNC: 8 MG/DL (ref 8.5–10.1)
CALCIUM SERPL-MCNC: 8.2 MG/DL (ref 8.5–10.1)
CALCIUM SERPL-MCNC: 8.3 MG/DL (ref 8.5–10.1)
CALCIUM SERPL-MCNC: 8.5 MG/DL (ref 8.5–10.1)
CALCIUM SERPL-MCNC: 8.7 MG/DL (ref 8.5–10.1)
CHLORIDE SERPL-SCNC: 103 MMOL/L (ref 94–109)
CHLORIDE SERPL-SCNC: 103 MMOL/L (ref 94–109)
CHLORIDE SERPL-SCNC: 105 MMOL/L (ref 94–109)
CHLORIDE SERPL-SCNC: 105 MMOL/L (ref 94–109)
CHLORIDE SERPL-SCNC: 106 MMOL/L (ref 94–109)
CHOLEST SERPL-MCNC: 210 MG/DL
CO2 SERPL-SCNC: 14 MMOL/L (ref 20–32)
CO2 SERPL-SCNC: 16 MMOL/L (ref 20–32)
CO2 SERPL-SCNC: 26 MMOL/L (ref 20–32)
CO2 SERPL-SCNC: 27 MMOL/L (ref 20–32)
CO2 SERPL-SCNC: 8 MMOL/L (ref 20–32)
COLOR UR AUTO: ABNORMAL
COLOR UR AUTO: YELLOW
COPATH REPORT: NORMAL
CREAT SERPL-MCNC: 1.71 MG/DL (ref 0.66–1.25)
CREAT SERPL-MCNC: 1.81 MG/DL (ref 0.66–1.25)
CREAT SERPL-MCNC: 2.15 MG/DL (ref 0.66–1.25)
CREAT SERPL-MCNC: 2.29 MG/DL (ref 0.66–1.25)
CREAT SERPL-MCNC: 2.68 MG/DL (ref 0.66–1.25)
CRP SERPL-MCNC: 117 MG/L (ref 0–8)
DIFFERENTIAL METHOD BLD: ABNORMAL
DIFFERENTIAL METHOD BLD: ABNORMAL
EOSINOPHIL # BLD AUTO: 0.1 10E9/L (ref 0–0.7)
EOSINOPHIL # BLD AUTO: 0.4 10E9/L (ref 0–0.7)
EOSINOPHIL NFR BLD AUTO: 0.4 %
EOSINOPHIL NFR BLD AUTO: 4.2 %
ERYTHROCYTE [DISTWIDTH] IN BLOOD BY AUTOMATED COUNT: 13.5 % (ref 10–15)
ERYTHROCYTE [DISTWIDTH] IN BLOOD BY AUTOMATED COUNT: 14.9 % (ref 10–15)
ERYTHROCYTE [DISTWIDTH] IN BLOOD BY AUTOMATED COUNT: 17 % (ref 10–15)
FERRITIN SERPL-MCNC: 19 NG/ML (ref 26–388)
GASTROCULT GAST QL: POSITIVE
GFR SERPL CREATININE-BSD FRML MDRD: 22 ML/MIN/1.7M2
GFR SERPL CREATININE-BSD FRML MDRD: 27 ML/MIN/1.7M2
GFR SERPL CREATININE-BSD FRML MDRD: 29 ML/MIN/1.7M2
GFR SERPL CREATININE-BSD FRML MDRD: 35 ML/MIN/1.7M2
GFR SERPL CREATININE-BSD FRML MDRD: 38 ML/MIN/1.7M2
GLUCOSE SERPL-MCNC: 150 MG/DL (ref 70–99)
GLUCOSE SERPL-MCNC: 155 MG/DL (ref 70–99)
GLUCOSE SERPL-MCNC: 40 MG/DL (ref 70–99)
GLUCOSE SERPL-MCNC: 80 MG/DL (ref 70–99)
GLUCOSE SERPL-MCNC: 95 MG/DL (ref 70–99)
GLUCOSE UR STRIP-MCNC: NEGATIVE MG/DL
GLUCOSE UR STRIP-MCNC: NEGATIVE MG/DL
HCO3 BLD-SCNC: 4 MMOL/L (ref 21–28)
HCO3 BLD-SCNC: 6 MMOL/L (ref 21–28)
HCO3 BLD-SCNC: 7 MMOL/L (ref 21–28)
HCT VFR BLD AUTO: 28.3 % (ref 40–53)
HCT VFR BLD AUTO: 32.5 % (ref 40–53)
HCT VFR BLD AUTO: 32.6 % (ref 40–53)
HDLC SERPL-MCNC: 50 MG/DL
HGB BLD-MCNC: 10 G/DL (ref 13.3–17.7)
HGB BLD-MCNC: 10 G/DL (ref 13.3–17.7)
HGB BLD-MCNC: 10.3 G/DL (ref 13.3–17.7)
HGB BLD-MCNC: 10.4 G/DL (ref 13.3–17.7)
HGB BLD-MCNC: 10.7 G/DL (ref 13.3–17.7)
HGB BLD-MCNC: 7.2 G/DL (ref 13.3–17.7)
HGB BLD-MCNC: 7.9 G/DL (ref 13.3–17.7)
HGB BLD-MCNC: 8.2 G/DL (ref 13.3–17.7)
HGB BLD-MCNC: 8.4 G/DL (ref 13.3–17.7)
HGB UR QL STRIP: ABNORMAL
HGB UR QL STRIP: NEGATIVE
IMM GRANULOCYTES # BLD: 0 10E9/L (ref 0–0.4)
IMM GRANULOCYTES # BLD: 0.1 10E9/L (ref 0–0.4)
IMM GRANULOCYTES NFR BLD: 0.1 %
IMM GRANULOCYTES NFR BLD: 0.8 %
IRON SATN MFR SERPL: 14 % (ref 15–46)
IRON SERPL-MCNC: 47 UG/DL (ref 35–180)
KETONES UR STRIP-MCNC: NEGATIVE MG/DL
KETONES UR STRIP-MCNC: NEGATIVE MG/DL
LACTATE BLD-SCNC: 11.9 MMOL/L (ref 0.7–2)
LACTATE BLD-SCNC: 16 MMOL/L (ref 0.7–2)
LACTATE BLD-SCNC: 20 MMOL/L (ref 0.7–2)
LDH SERPL L TO P-CCNC: 184 U/L (ref 85–227)
LDLC SERPL CALC-MCNC: 138 MG/DL
LEUKOCYTE ESTERASE UR QL STRIP: ABNORMAL
LEUKOCYTE ESTERASE UR QL STRIP: NEGATIVE
LYMPHOCYTES # BLD AUTO: 2.4 10E9/L (ref 0.8–5.3)
LYMPHOCYTES # BLD AUTO: 3 10E9/L (ref 0.8–5.3)
LYMPHOCYTES NFR BLD AUTO: 18.1 %
LYMPHOCYTES NFR BLD AUTO: 34.3 %
Lab: NORMAL
MAGNESIUM SERPL-MCNC: 2.7 MG/DL (ref 1.6–2.3)
MCH RBC QN AUTO: 28.9 PG (ref 26.5–33)
MCH RBC QN AUTO: 29.6 PG (ref 26.5–33)
MCH RBC QN AUTO: 30 PG (ref 26.5–33)
MCHC RBC AUTO-ENTMCNC: 29 G/DL (ref 31.5–36.5)
MCHC RBC AUTO-ENTMCNC: 30.8 G/DL (ref 31.5–36.5)
MCHC RBC AUTO-ENTMCNC: 31.9 G/DL (ref 31.5–36.5)
MCV RBC AUTO: 100 FL (ref 78–100)
MCV RBC AUTO: 93 FL (ref 78–100)
MCV RBC AUTO: 98 FL (ref 78–100)
MONOCYTES # BLD AUTO: 0.4 10E9/L (ref 0–1.3)
MONOCYTES # BLD AUTO: 0.7 10E9/L (ref 0–1.3)
MONOCYTES NFR BLD AUTO: 3 %
MONOCYTES NFR BLD AUTO: 8.5 %
MUCOUS THREADS #/AREA URNS LPF: PRESENT /LPF
NEUTROPHILS # BLD AUTO: 10.1 10E9/L (ref 1.6–8.3)
NEUTROPHILS # BLD AUTO: 4.5 10E9/L (ref 1.6–8.3)
NEUTROPHILS NFR BLD AUTO: 52.6 %
NEUTROPHILS NFR BLD AUTO: 77.4 %
NITRATE UR QL: NEGATIVE
NITRATE UR QL: NEGATIVE
NONHDLC SERPL-MCNC: 160 MG/DL
NRBC # BLD AUTO: 0 10*3/UL
NRBC BLD AUTO-RTO: 0 /100
NT-PROBNP SERPL-MCNC: ABNORMAL PG/ML (ref 0–1800)
NUM BPU REQUESTED: 2
O2/TOTAL GAS SETTING VFR VENT: 100 %
O2/TOTAL GAS SETTING VFR VENT: 100 %
O2/TOTAL GAS SETTING VFR VENT: 70 %
PCO2 BLD: 17 MM HG (ref 35–45)
PCO2 BLD: 17 MM HG (ref 35–45)
PCO2 BLD: 23 MM HG (ref 35–45)
PH BLD: 7 PH (ref 7.35–7.45)
PH BLD: 7.01 PH (ref 7.35–7.45)
PH BLD: 7.21 PH (ref 7.35–7.45)
PH GAST: 3 PH
PH UR STRIP: 5 PH (ref 5–7)
PH UR STRIP: 6 PH (ref 5–7)
PLATELET # BLD AUTO: 294 10E9/L (ref 150–450)
PLATELET # BLD AUTO: 299 10E9/L (ref 150–450)
PLATELET # BLD AUTO: 442 10E9/L (ref 150–450)
PO2 BLD: 115 MM HG (ref 80–105)
PO2 BLD: 407 MM HG (ref 80–105)
PO2 BLD: 460 MM HG (ref 80–105)
POTASSIUM SERPL-SCNC: 4 MMOL/L (ref 3.4–5.3)
POTASSIUM SERPL-SCNC: 4.9 MMOL/L (ref 3.4–5.3)
POTASSIUM SERPL-SCNC: 5.3 MMOL/L (ref 3.4–5.3)
POTASSIUM SERPL-SCNC: 5.7 MMOL/L (ref 3.4–5.3)
POTASSIUM SERPL-SCNC: 6.1 MMOL/L (ref 3.4–5.3)
PROCALCITONIN SERPL-MCNC: 1.99 NG/ML
PROT SERPL-MCNC: 6.3 G/DL (ref 6.8–8.8)
PSA SERPL-MCNC: 0.12 UG/L (ref 0–4)
RBC # BLD AUTO: 2.84 10E12/L (ref 4.4–5.9)
RBC # BLD AUTO: 3.33 10E12/L (ref 4.4–5.9)
RBC # BLD AUTO: 3.51 10E12/L (ref 4.4–5.9)
RBC #/AREA URNS AUTO: 1 /HPF (ref 0–2)
RBC #/AREA URNS AUTO: >182 /HPF (ref 0–2)
RETICS # AUTO: 36.6 10E9/L (ref 25–95)
RETICS/RBC NFR AUTO: 1.1 % (ref 0.5–2)
SODIUM SERPL-SCNC: 139 MMOL/L (ref 133–144)
SODIUM SERPL-SCNC: 139 MMOL/L (ref 133–144)
SODIUM SERPL-SCNC: 140 MMOL/L (ref 133–144)
SODIUM SERPL-SCNC: 141 MMOL/L (ref 133–144)
SODIUM SERPL-SCNC: 142 MMOL/L (ref 133–144)
SOURCE: ABNORMAL
SOURCE: ABNORMAL
SP GR UR STRIP: 1.02 (ref 1–1.03)
SP GR UR STRIP: 1.02 (ref 1–1.03)
SPECIMEN EXP DATE BLD: NORMAL
SPECIMEN SOURCE: NORMAL
SQUAMOUS #/AREA URNS AUTO: 5 /HPF (ref 0–1)
TIBC SERPL-MCNC: 345 UG/DL (ref 240–430)
TRANSFUSION STATUS PATIENT QL: NORMAL
TRIGL SERPL-MCNC: 112 MG/DL
TROPONIN I SERPL-MCNC: 1.99 UG/L (ref 0–0.04)
TROPONIN I SERPL-MCNC: 5.57 UG/L (ref 0–0.04)
UROBILINOGEN UR STRIP-MCNC: 0 MG/DL (ref 0–2)
UROBILINOGEN UR STRIP-MCNC: 0 MG/DL (ref 0–2)
WBC # BLD AUTO: 13 10E9/L (ref 4–11)
WBC # BLD AUTO: 13 10E9/L (ref 4–11)
WBC # BLD AUTO: 14.6 10E9/L (ref 4–11)
WBC # BLD AUTO: 14.6 10E9/L (ref 4–11)
WBC # BLD AUTO: 8.6 10E9/L (ref 4–11)
WBC #/AREA URNS AUTO: 0 /HPF (ref 0–5)
WBC #/AREA URNS AUTO: >182 /HPF (ref 0–5)
WBC CLUMPS #/AREA URNS HPF: PRESENT /HPF

## 2018-01-01 PROCEDURE — 83615 LACTATE (LD) (LDH) ENZYME: CPT | Performed by: FAMILY MEDICINE

## 2018-01-01 PROCEDURE — 36415 COLL VENOUS BLD VENIPUNCTURE: CPT | Performed by: HOSPITALIST

## 2018-01-01 PROCEDURE — G0378 HOSPITAL OBSERVATION PER HR: HCPCS

## 2018-01-01 PROCEDURE — 99291 CRITICAL CARE FIRST HOUR: CPT | Performed by: HOSPITALIST

## 2018-01-01 PROCEDURE — A9270 NON-COVERED ITEM OR SERVICE: HCPCS | Mod: GY | Performed by: EMERGENCY MEDICINE

## 2018-01-01 PROCEDURE — 25000128 H RX IP 250 OP 636: Performed by: EMERGENCY MEDICINE

## 2018-01-01 PROCEDURE — 83880 ASSAY OF NATRIURETIC PEPTIDE: CPT | Performed by: HOSPITALIST

## 2018-01-01 PROCEDURE — 25000128 H RX IP 250 OP 636: Performed by: NURSE ANESTHETIST, CERTIFIED REGISTERED

## 2018-01-01 PROCEDURE — 84153 ASSAY OF PSA TOTAL: CPT | Performed by: UROLOGY

## 2018-01-01 PROCEDURE — 85027 COMPLETE CBC AUTOMATED: CPT | Performed by: INTERNAL MEDICINE

## 2018-01-01 PROCEDURE — 25000125 ZZHC RX 250: Performed by: FAMILY MEDICINE

## 2018-01-01 PROCEDURE — 99212 OFFICE O/P EST SF 10 MIN: CPT | Performed by: FAMILY MEDICINE

## 2018-01-01 PROCEDURE — 37000008 ZZH ANESTHESIA TECHNICAL FEE, 1ST 30 MIN: Performed by: UROLOGY

## 2018-01-01 PROCEDURE — 40000275 ZZH STATISTIC RCP TIME EA 10 MIN

## 2018-01-01 PROCEDURE — 80048 BASIC METABOLIC PNL TOTAL CA: CPT | Performed by: INTERNAL MEDICINE

## 2018-01-01 PROCEDURE — 93005 ELECTROCARDIOGRAM TRACING: CPT

## 2018-01-01 PROCEDURE — 99207 ZZC NON-BILLABLE SERV PER CHARTING: CPT | Performed by: PEDIATRICS

## 2018-01-01 PROCEDURE — 25000132 ZZH RX MED GY IP 250 OP 250 PS 637: Mod: GY | Performed by: NURSE PRACTITIONER

## 2018-01-01 PROCEDURE — 83550 IRON BINDING TEST: CPT | Performed by: FAMILY MEDICINE

## 2018-01-01 PROCEDURE — P9047 ALBUMIN (HUMAN), 25%, 50ML: HCPCS | Performed by: INTERNAL MEDICINE

## 2018-01-01 PROCEDURE — 86140 C-REACTIVE PROTEIN: CPT | Performed by: HOSPITALIST

## 2018-01-01 PROCEDURE — 85060 BLOOD SMEAR INTERPRETATION: CPT | Performed by: FAMILY MEDICINE

## 2018-01-01 PROCEDURE — 40000193 ZZH STATISTIC PT WARD VISIT

## 2018-01-01 PROCEDURE — 99309 SBSQ NF CARE MODERATE MDM 30: CPT | Performed by: NURSE PRACTITIONER

## 2018-01-01 PROCEDURE — 99203 OFFICE O/P NEW LOW 30 MIN: CPT | Performed by: ORTHOPAEDIC SURGERY

## 2018-01-01 PROCEDURE — 71000015 ZZH RECOVERY PHASE 1 LEVEL 2 EA ADDTL HR: Performed by: UROLOGY

## 2018-01-01 PROCEDURE — 87040 BLOOD CULTURE FOR BACTERIA: CPT | Performed by: HOSPITALIST

## 2018-01-01 PROCEDURE — 97530 THERAPEUTIC ACTIVITIES: CPT | Mod: GP | Performed by: PHYSICAL THERAPIST

## 2018-01-01 PROCEDURE — 0T7D8ZZ DILATION OF URETHRA, VIA NATURAL OR ARTIFICIAL OPENING ENDOSCOPIC: ICD-10-PCS | Performed by: UROLOGY

## 2018-01-01 PROCEDURE — 36000050 ZZH SURGERY LEVEL 2 1ST 30 MIN: Performed by: UROLOGY

## 2018-01-01 PROCEDURE — 85045 AUTOMATED RETICULOCYTE COUNT: CPT | Performed by: FAMILY MEDICINE

## 2018-01-01 PROCEDURE — 40000718 ZZHC STATISTIC PT DEPARTMENT ORTHO VISIT: Performed by: PHYSICAL THERAPIST

## 2018-01-01 PROCEDURE — A9270 NON-COVERED ITEM OR SERVICE: HCPCS | Mod: GY | Performed by: INTERNAL MEDICINE

## 2018-01-01 PROCEDURE — 83605 ASSAY OF LACTIC ACID: CPT | Performed by: HOSPITALIST

## 2018-01-01 PROCEDURE — 85018 HEMOGLOBIN: CPT | Performed by: PEDIATRICS

## 2018-01-01 PROCEDURE — 36600 WITHDRAWAL OF ARTERIAL BLOOD: CPT

## 2018-01-01 PROCEDURE — 99292 CRITICAL CARE ADDL 30 MIN: CPT | Performed by: HOSPITALIST

## 2018-01-01 PROCEDURE — 27210794 ZZH OR GENERAL SUPPLY STERILE: Performed by: UROLOGY

## 2018-01-01 PROCEDURE — 99214 OFFICE O/P EST MOD 30 MIN: CPT | Performed by: FAMILY MEDICINE

## 2018-01-01 PROCEDURE — 25000125 ZZHC RX 250: Performed by: NURSE ANESTHETIST, CERTIFIED REGISTERED

## 2018-01-01 PROCEDURE — 40000986 XR CHEST PORT 1 VW

## 2018-01-01 PROCEDURE — 97161 PT EVAL LOW COMPLEX 20 MIN: CPT | Mod: GP

## 2018-01-01 PROCEDURE — 86850 RBC ANTIBODY SCREEN: CPT | Performed by: NURSE PRACTITIONER

## 2018-01-01 PROCEDURE — 86900 BLOOD TYPING SEROLOGIC ABO: CPT | Performed by: NURSE PRACTITIONER

## 2018-01-01 PROCEDURE — 25000125 ZZHC RX 250: Performed by: EMERGENCY MEDICINE

## 2018-01-01 PROCEDURE — 84145 PROCALCITONIN (PCT): CPT | Performed by: HOSPITALIST

## 2018-01-01 PROCEDURE — 83735 ASSAY OF MAGNESIUM: CPT | Performed by: HOSPITALIST

## 2018-01-01 PROCEDURE — 80076 HEPATIC FUNCTION PANEL: CPT | Performed by: HOSPITALIST

## 2018-01-01 PROCEDURE — 40000671 ZZH STATISTIC ANESTHESIA CASE

## 2018-01-01 PROCEDURE — 71000014 ZZH RECOVERY PHASE 1 LEVEL 2 FIRST HR: Performed by: UROLOGY

## 2018-01-01 PROCEDURE — 36415 COLL VENOUS BLD VENIPUNCTURE: CPT | Performed by: INTERNAL MEDICINE

## 2018-01-01 PROCEDURE — 87186 SC STD MICRODIL/AGAR DIL: CPT | Performed by: HOSPITALIST

## 2018-01-01 PROCEDURE — 87081 CULTURE SCREEN ONLY: CPT | Performed by: HOSPITALIST

## 2018-01-01 PROCEDURE — 82803 BLOOD GASES ANY COMBINATION: CPT | Performed by: HOSPITALIST

## 2018-01-01 PROCEDURE — 40000274 ZZH STATISTIC RCP CONSULT EA 30 MIN

## 2018-01-01 PROCEDURE — 85018 HEMOGLOBIN: CPT | Performed by: FAMILY MEDICINE

## 2018-01-01 PROCEDURE — 87086 URINE CULTURE/COLONY COUNT: CPT | Performed by: HOSPITALIST

## 2018-01-01 PROCEDURE — 85025 COMPLETE CBC W/AUTO DIFF WBC: CPT | Performed by: FAMILY MEDICINE

## 2018-01-01 PROCEDURE — 0TJB8ZZ INSPECTION OF BLADDER, VIA NATURAL OR ARTIFICIAL OPENING ENDOSCOPIC: ICD-10-PCS | Performed by: UROLOGY

## 2018-01-01 PROCEDURE — 83540 ASSAY OF IRON: CPT | Performed by: FAMILY MEDICINE

## 2018-01-01 PROCEDURE — 36415 COLL VENOUS BLD VENIPUNCTURE: CPT | Performed by: FAMILY MEDICINE

## 2018-01-01 PROCEDURE — 40000270 ZZH STATISTIC OXYGEN  O2DAILY TECH TIME

## 2018-01-01 PROCEDURE — P9016 RBC LEUKOCYTES REDUCED: HCPCS | Performed by: FAMILY MEDICINE

## 2018-01-01 PROCEDURE — 20000003 ZZH R&B ICU

## 2018-01-01 PROCEDURE — 99285 EMERGENCY DEPT VISIT HI MDM: CPT | Mod: 25 | Performed by: EMERGENCY MEDICINE

## 2018-01-01 PROCEDURE — 36415 COLL VENOUS BLD VENIPUNCTURE: CPT | Performed by: PEDIATRICS

## 2018-01-01 PROCEDURE — 99213 OFFICE O/P EST LOW 20 MIN: CPT | Performed by: ORTHOPAEDIC SURGERY

## 2018-01-01 PROCEDURE — 99207 ZZC APP CREDIT; MD BILLING SHARED VISIT: CPT | Performed by: FAMILY MEDICINE

## 2018-01-01 PROCEDURE — 25000125 ZZHC RX 250: Performed by: HOSPITALIST

## 2018-01-01 PROCEDURE — 87088 URINE BACTERIA CULTURE: CPT | Performed by: HOSPITALIST

## 2018-01-01 PROCEDURE — 85025 COMPLETE CBC W/AUTO DIFF WBC: CPT | Performed by: HOSPITALIST

## 2018-01-01 PROCEDURE — 25000132 ZZH RX MED GY IP 250 OP 250 PS 637: Mod: GY | Performed by: EMERGENCY MEDICINE

## 2018-01-01 PROCEDURE — 25000128 H RX IP 250 OP 636: Performed by: HOSPITALIST

## 2018-01-01 PROCEDURE — 73562 X-RAY EXAM OF KNEE 3: CPT | Mod: TC,LT

## 2018-01-01 PROCEDURE — 99219 ZZC INITIAL OBSERVATION CARE,LEVL II: CPT | Performed by: INTERNAL MEDICINE

## 2018-01-01 PROCEDURE — 40000306 ZZH STATISTIC PRE PROC ASSESS II: Performed by: UROLOGY

## 2018-01-01 PROCEDURE — 25000128 H RX IP 250 OP 636: Performed by: INTERNAL MEDICINE

## 2018-01-01 PROCEDURE — 80048 BASIC METABOLIC PNL TOTAL CA: CPT | Performed by: HOSPITALIST

## 2018-01-01 PROCEDURE — A9270 NON-COVERED ITEM OR SERVICE: HCPCS | Mod: GY | Performed by: NURSE PRACTITIONER

## 2018-01-01 PROCEDURE — 25000132 ZZH RX MED GY IP 250 OP 250 PS 637: Mod: GY | Performed by: INTERNAL MEDICINE

## 2018-01-01 PROCEDURE — 86923 COMPATIBILITY TEST ELECTRIC: CPT | Performed by: NURSE PRACTITIONER

## 2018-01-01 PROCEDURE — 80048 BASIC METABOLIC PNL TOTAL CA: CPT | Performed by: FAMILY MEDICINE

## 2018-01-01 PROCEDURE — 94660 CPAP INITIATION&MGMT: CPT

## 2018-01-01 PROCEDURE — 25000564 ZZH DESFLURANE, EA 15 MIN: Performed by: UROLOGY

## 2018-01-01 PROCEDURE — 82271 OCCULT BLOOD OTHER SOURCES: CPT | Performed by: PEDIATRICS

## 2018-01-01 PROCEDURE — 81001 URINALYSIS AUTO W/SCOPE: CPT | Performed by: HOSPITALIST

## 2018-01-01 PROCEDURE — 82728 ASSAY OF FERRITIN: CPT | Performed by: FAMILY MEDICINE

## 2018-01-01 PROCEDURE — 99310 SBSQ NF CARE HIGH MDM 45: CPT | Performed by: NURSE PRACTITIONER

## 2018-01-01 PROCEDURE — 73721 MRI JNT OF LWR EXTRE W/O DYE: CPT | Mod: LT

## 2018-01-01 PROCEDURE — 71045 X-RAY EXAM CHEST 1 VIEW: CPT | Mod: TC

## 2018-01-01 PROCEDURE — 25800025 ZZH RX 258: Performed by: FAMILY MEDICINE

## 2018-01-01 PROCEDURE — 80061 LIPID PANEL: CPT | Performed by: FAMILY MEDICINE

## 2018-01-01 PROCEDURE — 87633 RESP VIRUS 12-25 TARGETS: CPT | Performed by: HOSPITALIST

## 2018-01-01 PROCEDURE — 99217 ZZC OBSERVATION CARE DISCHARGE: CPT | Performed by: INTERNAL MEDICINE

## 2018-01-01 PROCEDURE — 25000132 ZZH RX MED GY IP 250 OP 250 PS 637: Performed by: HOSPITALIST

## 2018-01-01 PROCEDURE — 37000009 ZZH ANESTHESIA TECHNICAL FEE, EACH ADDTL 15 MIN: Performed by: UROLOGY

## 2018-01-01 PROCEDURE — 86901 BLOOD TYPING SEROLOGIC RH(D): CPT | Performed by: NURSE PRACTITIONER

## 2018-01-01 PROCEDURE — 36000052 ZZH SURGERY LEVEL 2 EA 15 ADDTL MIN: Performed by: UROLOGY

## 2018-01-01 PROCEDURE — 36430 TRANSFUSION BLD/BLD COMPNT: CPT

## 2018-01-01 PROCEDURE — 84484 ASSAY OF TROPONIN QUANT: CPT | Performed by: HOSPITALIST

## 2018-01-01 PROCEDURE — 99285 EMERGENCY DEPT VISIT HI MDM: CPT | Performed by: EMERGENCY MEDICINE

## 2018-01-01 RX ORDER — ACETAMINOPHEN 325 MG/1
650 TABLET ORAL EVERY 4 HOURS PRN
Qty: 100 TABLET | DISCHARGE
Start: 2018-01-01 | End: 2018-01-01 | Stop reason: DRUGHIGH

## 2018-01-01 RX ORDER — FENTANYL CITRATE 50 UG/ML
25-50 INJECTION, SOLUTION INTRAMUSCULAR; INTRAVENOUS
Status: DISCONTINUED | OUTPATIENT
Start: 2018-01-01 | End: 2018-01-01 | Stop reason: HOSPADM

## 2018-01-01 RX ORDER — DEXTROSE MONOHYDRATE 25 G/50ML
25-50 INJECTION, SOLUTION INTRAVENOUS
Status: DISCONTINUED | OUTPATIENT
Start: 2018-01-01 | End: 2018-06-05 | Stop reason: HOSPADM

## 2018-01-01 RX ORDER — SODIUM CHLORIDE 9 MG/ML
INJECTION, SOLUTION INTRAVENOUS CONTINUOUS
Status: DISCONTINUED | OUTPATIENT
Start: 2018-01-01 | End: 2018-01-01 | Stop reason: HOSPADM

## 2018-01-01 RX ORDER — ONDANSETRON 4 MG/1
4 TABLET, ORALLY DISINTEGRATING ORAL ONCE
Status: COMPLETED | OUTPATIENT
Start: 2018-01-01 | End: 2018-01-01

## 2018-01-01 RX ORDER — ZINC OXIDE 216 MG/ML
4 LOTION TOPICAL DAILY
COMMUNITY

## 2018-01-01 RX ORDER — ONDANSETRON 2 MG/ML
4 INJECTION INTRAMUSCULAR; INTRAVENOUS EVERY 30 MIN PRN
Status: DISCONTINUED | OUTPATIENT
Start: 2018-01-01 | End: 2018-01-01 | Stop reason: HOSPADM

## 2018-01-01 RX ORDER — AMOXICILLIN 250 MG
2 CAPSULE ORAL 2 TIMES DAILY PRN
Status: DISCONTINUED | OUTPATIENT
Start: 2018-01-01 | End: 2018-06-05 | Stop reason: HOSPADM

## 2018-01-01 RX ORDER — NICOTINE POLACRILEX 4 MG
15-30 LOZENGE BUCCAL
Status: DISCONTINUED | OUTPATIENT
Start: 2018-01-01 | End: 2018-06-05 | Stop reason: HOSPADM

## 2018-01-01 RX ORDER — METOCLOPRAMIDE HYDROCHLORIDE 5 MG/ML
5 INJECTION INTRAMUSCULAR; INTRAVENOUS EVERY 6 HOURS PRN
Status: DISCONTINUED | OUTPATIENT
Start: 2018-01-01 | End: 2018-06-05 | Stop reason: HOSPADM

## 2018-01-01 RX ORDER — PROCHLORPERAZINE 25 MG
12.5 SUPPOSITORY, RECTAL RECTAL EVERY 12 HOURS PRN
Status: DISCONTINUED | OUTPATIENT
Start: 2018-01-01 | End: 2018-06-05 | Stop reason: HOSPADM

## 2018-01-01 RX ORDER — ONDANSETRON 4 MG/1
4 TABLET, ORALLY DISINTEGRATING ORAL EVERY 30 MIN PRN
Status: DISCONTINUED | OUTPATIENT
Start: 2018-01-01 | End: 2018-01-01 | Stop reason: HOSPADM

## 2018-01-01 RX ORDER — LISINOPRIL 2.5 MG/1
2.5 TABLET ORAL DAILY
Status: DISCONTINUED | OUTPATIENT
Start: 2018-01-01 | End: 2018-01-01 | Stop reason: HOSPADM

## 2018-01-01 RX ORDER — AMOXICILLIN 250 MG
1 CAPSULE ORAL 2 TIMES DAILY PRN
Status: DISCONTINUED | OUTPATIENT
Start: 2018-01-01 | End: 2018-06-05 | Stop reason: HOSPADM

## 2018-01-01 RX ORDER — ONDANSETRON 2 MG/ML
4 INJECTION INTRAMUSCULAR; INTRAVENOUS EVERY 6 HOURS PRN
Status: DISCONTINUED | OUTPATIENT
Start: 2018-01-01 | End: 2018-06-05 | Stop reason: HOSPADM

## 2018-01-01 RX ORDER — FERROUS GLUCONATE 324(38)MG
324 TABLET ORAL EVERY OTHER DAY
Qty: 100 TABLET | Refills: 0 | Status: ON HOLD | OUTPATIENT
Start: 2018-01-01 | End: 2018-01-01

## 2018-01-01 RX ORDER — NALOXONE HYDROCHLORIDE 0.4 MG/ML
.1-.4 INJECTION, SOLUTION INTRAMUSCULAR; INTRAVENOUS; SUBCUTANEOUS
Status: DISCONTINUED | OUTPATIENT
Start: 2018-01-01 | End: 2018-01-01

## 2018-01-01 RX ORDER — DEXTROSE MONOHYDRATE 25 G/50ML
25 INJECTION, SOLUTION INTRAVENOUS ONCE
Status: COMPLETED | OUTPATIENT
Start: 2018-01-01 | End: 2018-01-01

## 2018-01-01 RX ORDER — ASPIRIN 81 MG/1
81 TABLET ORAL DAILY
Status: DISCONTINUED | OUTPATIENT
Start: 2018-01-01 | End: 2018-01-01 | Stop reason: HOSPADM

## 2018-01-01 RX ORDER — FENTANYL CITRATE 50 UG/ML
50 INJECTION, SOLUTION INTRAMUSCULAR; INTRAVENOUS ONCE
Status: COMPLETED | OUTPATIENT
Start: 2018-01-01 | End: 2018-01-01

## 2018-01-01 RX ORDER — ETOMIDATE 2 MG/ML
INJECTION INTRAVENOUS PRN
Status: DISCONTINUED | OUTPATIENT
Start: 2018-01-01 | End: 2018-01-01

## 2018-01-01 RX ORDER — NALOXONE HYDROCHLORIDE 0.4 MG/ML
.1-.4 INJECTION, SOLUTION INTRAMUSCULAR; INTRAVENOUS; SUBCUTANEOUS
Status: DISCONTINUED | OUTPATIENT
Start: 2018-01-01 | End: 2018-06-05 | Stop reason: HOSPADM

## 2018-01-01 RX ORDER — ACETAMINOPHEN 325 MG/1
650 TABLET ORAL EVERY 4 HOURS PRN
Status: DISCONTINUED | OUTPATIENT
Start: 2018-01-01 | End: 2018-01-01 | Stop reason: HOSPADM

## 2018-01-01 RX ORDER — FUROSEMIDE 20 MG
20 TABLET ORAL ONCE
Status: COMPLETED | OUTPATIENT
Start: 2018-01-01 | End: 2018-01-01

## 2018-01-01 RX ORDER — LIDOCAINE HYDROCHLORIDE 20 MG/ML
INJECTION, SOLUTION INFILTRATION; PERINEURAL PRN
Status: DISCONTINUED | OUTPATIENT
Start: 2018-01-01 | End: 2018-01-01

## 2018-01-01 RX ORDER — SODIUM CHLORIDE, SODIUM LACTATE, POTASSIUM CHLORIDE, CALCIUM CHLORIDE 600; 310; 30; 20 MG/100ML; MG/100ML; MG/100ML; MG/100ML
INJECTION, SOLUTION INTRAVENOUS CONTINUOUS
Status: DISCONTINUED | OUTPATIENT
Start: 2018-01-01 | End: 2018-01-01

## 2018-01-01 RX ORDER — LISINOPRIL 5 MG/1
TABLET ORAL
Qty: 45 TABLET | Refills: 3 | DISCHARGE
Start: 2018-01-01 | End: 2018-01-01

## 2018-01-01 RX ORDER — ACETAMINOPHEN 325 MG/1
650 TABLET ORAL EVERY 4 HOURS PRN
Status: DISCONTINUED | OUTPATIENT
Start: 2018-01-01 | End: 2018-01-01

## 2018-01-01 RX ORDER — DEXTROSE MONOHYDRATE 100 MG/ML
INJECTION, SOLUTION INTRAVENOUS CONTINUOUS
Status: DISCONTINUED | OUTPATIENT
Start: 2018-01-01 | End: 2018-06-05 | Stop reason: HOSPADM

## 2018-01-01 RX ORDER — ALBUMIN (HUMAN) 12.5 G/50ML
12.5 SOLUTION INTRAVENOUS ONCE
Status: COMPLETED | OUTPATIENT
Start: 2018-01-01 | End: 2018-01-01

## 2018-01-01 RX ORDER — METOCLOPRAMIDE 5 MG/1
5 TABLET ORAL EVERY 6 HOURS PRN
Status: DISCONTINUED | OUTPATIENT
Start: 2018-01-01 | End: 2018-06-05 | Stop reason: HOSPADM

## 2018-01-01 RX ORDER — FUROSEMIDE 20 MG
20 TABLET ORAL ONCE
Status: CANCELLED
Start: 2018-01-01 | End: 2018-01-01

## 2018-01-01 RX ORDER — CEFAZOLIN SODIUM 1 G/3ML
INJECTION, POWDER, FOR SOLUTION INTRAMUSCULAR; INTRAVENOUS PRN
Status: DISCONTINUED | OUTPATIENT
Start: 2018-01-01 | End: 2018-01-01

## 2018-01-01 RX ORDER — PROCHLORPERAZINE MALEATE 5 MG
5 TABLET ORAL EVERY 6 HOURS PRN
Status: DISCONTINUED | OUTPATIENT
Start: 2018-01-01 | End: 2018-06-05 | Stop reason: HOSPADM

## 2018-01-01 RX ORDER — MEPERIDINE HYDROCHLORIDE 25 MG/ML
12.5 INJECTION INTRAMUSCULAR; INTRAVENOUS; SUBCUTANEOUS
Status: DISCONTINUED | OUTPATIENT
Start: 2018-01-01 | End: 2018-01-01 | Stop reason: HOSPADM

## 2018-01-01 RX ORDER — ONDANSETRON 4 MG/1
4 TABLET, ORALLY DISINTEGRATING ORAL EVERY 6 HOURS PRN
Status: DISCONTINUED | OUTPATIENT
Start: 2018-01-01 | End: 2018-01-01 | Stop reason: HOSPADM

## 2018-01-01 RX ORDER — LIDOCAINE 40 MG/G
CREAM TOPICAL
Status: DISCONTINUED | OUTPATIENT
Start: 2018-01-01 | End: 2018-01-01 | Stop reason: HOSPADM

## 2018-01-01 RX ORDER — ONDANSETRON 2 MG/ML
4 INJECTION INTRAMUSCULAR; INTRAVENOUS EVERY 6 HOURS PRN
Status: DISCONTINUED | OUTPATIENT
Start: 2018-01-01 | End: 2018-01-01 | Stop reason: HOSPADM

## 2018-01-01 RX ORDER — HEPARIN SODIUM 5000 [USP'U]/.5ML
5000 INJECTION, SOLUTION INTRAVENOUS; SUBCUTANEOUS EVERY 8 HOURS
Status: CANCELLED | OUTPATIENT
Start: 2018-01-01

## 2018-01-01 RX ORDER — NALOXONE HYDROCHLORIDE 0.4 MG/ML
.1-.4 INJECTION, SOLUTION INTRAMUSCULAR; INTRAVENOUS; SUBCUTANEOUS
Status: DISCONTINUED | OUTPATIENT
Start: 2018-01-01 | End: 2018-01-01 | Stop reason: HOSPADM

## 2018-01-01 RX ORDER — FERROUS GLUCONATE 324(38)MG
324 TABLET ORAL EVERY OTHER DAY
Qty: 100 TABLET | Refills: 0 | DISCHARGE
Start: 2018-01-01

## 2018-01-01 RX ORDER — METOPROLOL TARTRATE 1 MG/ML
1-2 INJECTION, SOLUTION INTRAVENOUS EVERY 5 MIN PRN
Status: DISCONTINUED | OUTPATIENT
Start: 2018-01-01 | End: 2018-01-01 | Stop reason: HOSPADM

## 2018-01-01 RX ORDER — OXYCODONE HYDROCHLORIDE 5 MG/1
5 TABLET ORAL ONCE
Status: DISCONTINUED | OUTPATIENT
Start: 2018-01-01 | End: 2018-06-05 | Stop reason: HOSPADM

## 2018-01-01 RX ORDER — ONDANSETRON 2 MG/ML
4 INJECTION INTRAMUSCULAR; INTRAVENOUS EVERY 6 HOURS PRN
Status: DISCONTINUED | OUTPATIENT
Start: 2018-01-01 | End: 2018-01-01

## 2018-01-01 RX ORDER — ALBUTEROL SULFATE 0.83 MG/ML
2.5 SOLUTION RESPIRATORY (INHALATION) EVERY 4 HOURS PRN
Status: DISCONTINUED | OUTPATIENT
Start: 2018-01-01 | End: 2018-01-01 | Stop reason: HOSPADM

## 2018-01-01 RX ORDER — HEPARIN SODIUM 10000 [USP'U]/100ML
0-3500 INJECTION, SOLUTION INTRAVENOUS CONTINUOUS
Status: DISCONTINUED | OUTPATIENT
Start: 2018-01-01 | End: 2018-06-05 | Stop reason: HOSPADM

## 2018-01-01 RX ORDER — ONDANSETRON 4 MG/1
4 TABLET, ORALLY DISINTEGRATING ORAL EVERY 6 HOURS PRN
Status: DISCONTINUED | OUTPATIENT
Start: 2018-01-01 | End: 2018-01-01

## 2018-01-01 RX ORDER — ONDANSETRON 4 MG/1
4 TABLET, ORALLY DISINTEGRATING ORAL EVERY 6 HOURS PRN
Status: DISCONTINUED | OUTPATIENT
Start: 2018-01-01 | End: 2018-06-05 | Stop reason: HOSPADM

## 2018-01-01 RX ORDER — METOPROLOL TARTRATE 25 MG/1
12.5 TABLET, FILM COATED ORAL 2 TIMES DAILY
Qty: 90 TABLET | Refills: 3 | DISCHARGE
Start: 2018-01-01

## 2018-01-01 RX ADMIN — SODIUM BICARBONATE 100 ML/HR: 84 INJECTION, SOLUTION INTRAVENOUS at 19:11

## 2018-01-01 RX ADMIN — ASPIRIN 81 MG: 81 TABLET, COATED ORAL at 10:01

## 2018-01-01 RX ADMIN — Medication 12.5 MG: at 10:02

## 2018-01-01 RX ADMIN — MIDAZOLAM 0.5 MG: 1 INJECTION INTRAMUSCULAR; INTRAVENOUS at 20:08

## 2018-01-01 RX ADMIN — NOREPINEPHRINE BITARTRATE 0.03 MCG/KG/MIN: 1 INJECTION INTRAVENOUS at 17:51

## 2018-01-01 RX ADMIN — ETOMIDATE 10 MG: 2 INJECTION INTRAVENOUS at 13:13

## 2018-01-01 RX ADMIN — ACETAMINOPHEN 650 MG: 325 TABLET ORAL at 17:58

## 2018-01-01 RX ADMIN — VASOPRESSIN 2.4 UNITS/HR: 20 INJECTION INTRAVENOUS at 19:11

## 2018-01-01 RX ADMIN — TAZOBACTAM SODIUM AND PIPERACILLIN SODIUM 2.25 G: 250; 2 INJECTION, SOLUTION INTRAVENOUS at 18:10

## 2018-01-01 RX ADMIN — FUROSEMIDE 20 MG: 20 TABLET ORAL at 12:03

## 2018-01-01 RX ADMIN — LISINOPRIL 2.5 MG: 2.5 TABLET ORAL at 10:01

## 2018-01-01 RX ADMIN — Medication 12.5 MG: at 21:56

## 2018-01-01 RX ADMIN — VANCOMYCIN HYDROCHLORIDE 1500 MG: 1 INJECTION, POWDER, LYOPHILIZED, FOR SOLUTION INTRAVENOUS at 18:55

## 2018-01-01 RX ADMIN — PHENYLEPHRINE HYDROCHLORIDE 100 MCG: 10 INJECTION, SOLUTION INTRAMUSCULAR; INTRAVENOUS; SUBCUTANEOUS at 13:29

## 2018-01-01 RX ADMIN — ASPIRIN 81 MG: 81 TABLET, COATED ORAL at 10:46

## 2018-01-01 RX ADMIN — FENTANYL CITRATE 50 MCG: 50 INJECTION, SOLUTION INTRAMUSCULAR; INTRAVENOUS at 15:00

## 2018-01-01 RX ADMIN — MIDAZOLAM 0.5 MG: 1 INJECTION INTRAMUSCULAR; INTRAVENOUS at 19:19

## 2018-01-01 RX ADMIN — DEXTROSE MONOHYDRATE 25 G: 25 INJECTION, SOLUTION INTRAVENOUS at 22:31

## 2018-01-01 RX ADMIN — ONDANSETRON 4 MG: 4 TABLET, ORALLY DISINTEGRATING ORAL at 14:48

## 2018-01-01 RX ADMIN — METOPROLOL TARTRATE 12.5 MG: 25 TABLET ORAL at 16:27

## 2018-01-01 RX ADMIN — ACETAMINOPHEN 650 MG: 325 TABLET ORAL at 12:52

## 2018-01-01 RX ADMIN — ONDANSETRON 4 MG: 4 TABLET, ORALLY DISINTEGRATING ORAL at 12:22

## 2018-01-01 RX ADMIN — PHENYLEPHRINE HYDROCHLORIDE 100 MCG: 10 INJECTION, SOLUTION INTRAMUSCULAR; INTRAVENOUS; SUBCUTANEOUS at 13:21

## 2018-01-01 RX ADMIN — Medication 12.5 MG: at 10:45

## 2018-01-01 RX ADMIN — ACETAMINOPHEN 650 MG: 325 TABLET ORAL at 21:56

## 2018-01-01 RX ADMIN — HEPARIN SODIUM 800 UNITS/HR: 10000 INJECTION, SOLUTION INTRAVENOUS at 17:43

## 2018-01-01 RX ADMIN — LIDOCAINE HYDROCHLORIDE 1 ML: 10 INJECTION, SOLUTION EPIDURAL; INFILTRATION; INTRACAUDAL; PERINEURAL at 12:17

## 2018-01-01 RX ADMIN — PHENYLEPHRINE HYDROCHLORIDE 100 MCG: 10 INJECTION, SOLUTION INTRAMUSCULAR; INTRAVENOUS; SUBCUTANEOUS at 13:33

## 2018-01-01 RX ADMIN — ALBUMIN HUMAN 12.5 G: 0.25 SOLUTION INTRAVENOUS at 19:11

## 2018-01-01 RX ADMIN — LISINOPRIL 2.5 MG: 2.5 TABLET ORAL at 10:46

## 2018-01-01 RX ADMIN — Medication 12.5 MG: at 20:25

## 2018-01-01 RX ADMIN — LIDOCAINE HYDROCHLORIDE 50 MG: 20 INJECTION, SOLUTION INFILTRATION; PERINEURAL at 13:12

## 2018-01-01 RX ADMIN — FENTANYL CITRATE 50 MCG: 50 INJECTION, SOLUTION INTRAMUSCULAR; INTRAVENOUS at 18:53

## 2018-01-01 RX ADMIN — CEFAZOLIN 1 G: 1 INJECTION, POWDER, FOR SOLUTION INTRAMUSCULAR; INTRAVENOUS at 13:23

## 2018-01-01 RX ADMIN — SODIUM BICARBONATE 25 MEQ: 84 INJECTION, SOLUTION INTRAVENOUS at 17:48

## 2018-01-01 RX ADMIN — CALCIUM GLUCONATE 1 G: 98 INJECTION, SOLUTION INTRAVENOUS at 19:11

## 2018-01-01 RX ADMIN — ETOMIDATE 20 MG: 2 INJECTION INTRAVENOUS at 13:12

## 2018-01-01 RX ADMIN — SODIUM CHLORIDE: 9 INJECTION, SOLUTION INTRAVENOUS at 12:18

## 2018-01-01 RX ADMIN — MIDAZOLAM 0.5 MG: 1 INJECTION INTRAMUSCULAR; INTRAVENOUS at 21:17

## 2018-01-01 ASSESSMENT — PAIN SCALES - GENERAL
PAINLEVEL: MODERATE PAIN (5)
PAINLEVEL: MILD PAIN (2)
PAINLEVEL: MODERATE PAIN (5)

## 2018-01-01 ASSESSMENT — PAIN DESCRIPTION - DESCRIPTORS: DESCRIPTORS: DISCOMFORT

## 2018-01-01 ASSESSMENT — ENCOUNTER SYMPTOMS: BACK PAIN: 0

## 2018-03-20 NOTE — PROGRESS NOTES
SUBJECTIVE:                                                      Chief Complaint   Patient presents with     Hypertension     recheck        Hypertension Follow-up      Outpatient blood pressures are not being checked.    Low Salt Diet: low salt    Heart Failure Follow-up    Symptoms:    Shortness of breath: none    Lower extremity edema: slight edema in both legs    Chest pain: No    Using more pillows than normal: No    Cough at night: No    Weight:    Checking weight daily: No    Weight change: none unable to check due to weight on scale being inaccurate    Cardiology visits, ER/UC, or hospital admissions since last visit: None    Medication side effects: none    Chronic Kidney Disease Follow-up      Current NSAID use?  No      Amount of exercise or physical activity: 6-7 days/week for an average of less than 15 minutes    Problems taking medications regularly: No    Medication side effects: none    Diet: low salt        Tristen is a 89 year old returns for routine follow-up    ROS:  Constitutional, HEENT, cardiovascular, pulmonary, gi and gu systems are negative, except as otherwise noted.    OBJECTIVE:                                                    /70 (BP Location: Right arm, Patient Position: Chair, Cuff Size: Adult Regular)  Pulse 72  Temp 96.2  F (35.7  C) (Temporal)  SpO2 98%  There is no height or weight on file to calculate BMI.    GENERAL: healthy, alert and no distress  NECK: no adenopathy, no asymmetry, masses, or scars and thyroid normal to palpation  RESP: lungs clear to auscultation - no rales, rhonchi or wheezes  CV: regular rate and rhythm, normal S1 S2, no S3 or S4, no murmur, click or rub, no peripheral edema and peripheral pulses strong  ABDOMEN: soft, nontender, no hepatosplenomegaly, no masses and bowel sounds normal  MS: no gross musculoskeletal defects noted, no edema.  Deconditioned    Diagnostic Test Results:  none      ASSESSMENT/PLAN:                                                         ICD-10-CM    1. Coronary artery disease involving native coronary artery of native heart without angina pectoris I25.10 Lipid panel reflex to direct LDL Fasting   2. Chronic combined systolic and diastolic congestive heart failure (H) I50.42    3. Mitral valve insufficiency, unspecified etiology I34.0    4. CKD (chronic kidney disease) stage 3, GFR 30-59 ml/min N18.3 Basic metabolic panel  (Ca, Cl, CO2, Creat, Gluc, K, Na, BUN)   5. Anemia D64.9 Hemoglobin     **Ferritin FUTURE 2mo     **Iron and iron binding capacity FUTURE 2mo     Reticulocyte count     Lactate Dehydrogenase     Blood Morphology Pathologist Review     CBC with platelets differential     Reticulocyte Count       Stable CAD.  Continue medical management.  Stable CHF.  Recheck routine lab work.  Has a history of anemia and we will recheck that today for stability.    Luis Miguel Watson MD  [facility]

## 2018-03-20 NOTE — MR AVS SNAPSHOT
"              After Visit Summary   3/20/2018    Tristen Canela    MRN: [id number]           Patient Information     Date Of Birth          1/20/1925        Visit Information        Provider Department      3/20/2018 10:40 AM Luis Miguel Watson MD [facility]        Today's Diagnoses     Coronary artery disease involving native coronary artery of native heart without angina pectoris    -  1    Chronic combined systolic and diastolic congestive heart failure (H)        Mitral valve insufficiency, unspecified etiology        CKD (chronic kidney disease) stage 3, GFR 30-59 ml/min           Follow-ups after your visit        Your next 10 appointments already scheduled     Jul 11, 2018  9:20 AM CDT   [facility]    [address] [address] [phone]              Who to contact     If you have questions or need follow up information about today's clinic visit or your schedule please contact [facility] directly at [phone].  Normal or non-critical lab and imaging results will be communicated to you by [employer] Technologieshart, letter or phone within 4 business days after the clinic has received the results. If you do not hear from us within 7 days, please contact the clinic through Mabayat or phone. If you have a critical or abnormal lab result, we will notify you by phone as soon as possible.  Submit refill requests through NuAx or call your pharmacy and they will forward the refill request to us. Please allow 3 business days for your refill to be completed.          Additional Information About Your Visit        [employer] Shape Technologieshar121nexus Information     NuAx lets you send messages to your doctor, view your test results, renew your prescriptions, schedule appointments and more. To sign up, go to [email or web address] . Click on \"Log in\" on the left side of the screen, which will take you to the " "Welcome page. Then click on \"Sign up Now\" on the right side of the page.     You will be asked to enter the access code listed below, as well as some personal information. Please follow the directions to create your username and password.     Your access code is: [email or web address]  Expires: 2018 11:15 AM     Your access code will  in 90 days. If you need help or a new code, please call your [place] clinic or [phone].        Care EveryWhere ID     This is your Care EveryWhere ID. This could be used by other organizations to access your [place] medical records  [id number]        Your Vitals Were     Pulse Temperature Pulse Oximetry             72 96.2  F (35.7  C) (Temporal) 98%          Blood Pressure from Last 3 Encounters:   18 130/70   17 136/84   17 139/82    Weight from Last 3 Encounters:   17 152 lb (68.9 kg)   17 154 lb 11.2 oz (70.2 kg)   16 152 lb 9.6 oz (69.2 kg)              We Performed the Following     Basic metabolic panel  (Ca, Cl, CO2, Creat, Gluc, K, Na, BUN)     Hemoglobin     Lipid panel reflex to direct LDL Fasting        Primary Care Provider Office Phone # Fax #    Robbjohana Franck Watson [phone] [address] [place] [address]        Equal Access to Services     [facility] CenterALES[facility] AH: Hadii aad ku hadasho Soomaali, waaxda luqadaha, qaybta kaalmada adeegyada, lloyd corcoran. So [facility] [phone].    ATENCIÓN: Si habla español, tiene a frank disposición servicios gratuitos de asistencia lingüística. Llame al [phone].    We comply with applicable federal civil rights laws and [place] laws. We do not discriminate on the basis of race, color, national origin, age, disability, sex, sexual orientation, or gender identity.            Thank you!     Thank you for choosing [facility]  for your care. Our goal is always to provide you with excellent care. Hearing back from our patients is " one way we can continue to improve our services. Please take a few minutes to complete the written survey that you may receive in the mail after your visit with us. Thank you!             Your Updated Medication List - Protect others around you: Learn how to safely use, store and throw away your medicines at www.disposemymeds.org.          This list is accurate as of 3/20/18 11:15 AM.  Always use your most recent med list.                   Brand Name Dispense Instructions for use Diagnosis    aspirin 81 MG EC tablet     90 tablet    Take 81 mg by mouth daily    Coronary artery disease involving native coronary artery of native heart without angina pectoris       lisinopril 5 MG tablet    PRINIVIL/ZESTRIL    45 tablet    TAKE ONE-HALF TABLET BY MOUTH ONCE DAILY    Benign essential hypertension       LUPRON 5 MG/ML Soln   Generic drug:  Leuprolide Acetate     1 mL    1 SHOT PER DR LARA EVERY FOUR MONTHS FOR PROSTATE CANCER    Prostate cancer (H)       metoprolol tartrate 25 MG tablet    LOPRESSOR    90 tablet    Take 0.5 tablets (12.5 mg) by mouth 2 times daily    Congestive heart failure, unspecified congestive heart failure chronicity, unspecified congestive heart failure type (H)       Multi-vitamin Tabs tablet   Generic drug:  multivitamin, therapeutic with minerals      Take 1 tablet by mouth daily.

## 2018-03-20 NOTE — LETTER
My Heart Failure Action Plan   Name: Tristen Canela    YOB: 1925   Date: 3/20/2018    My doctor: Luis Miguel Watson     [address] [address] [phone]  My Diagnosis: Combined Heart Failure   My Ejection Fraction: to be discussed with cardiology    My Exercise Goal: 30 minutes daily  .     My Weight Goal: stable  Wt Readings from Last 2 Encounters:   11/21/17 152 lb (68.9 kg)   03/01/17 154 lb 11.2 oz (70.2 kg)     Weigh yourself daily using the same scale. If you gain more than 2 pounds in 24 hours or 5 pounds in a week call the clinic    My Diet Goal: No added salt    Emergency Room Visits:    Our goal is to improve your quality of life and help you avoid a visit to the emergency room or hospital.  If we work together, we can achieve this goal. But, if you feel you need to call 911 or go to the emergency room, please do so.  If you go to the emergency room, please bring your list of medicines and your daily weight chart with you.       GREEN ZONE     Doing well today    Weight gained is no more than 2 pounds a day or 5 pounds a week.    No swelling in feet, ankles, legs or stomach.    No more swelling than usual.    No more trouble breathing than usual.    No change in my sleep.    No other problems. Actions:    I am doing fine.  I will take my medicine, follow my diet, see my doctor, exercise, and watch for symptoms.           YELLOW ZONE         Having a bad day or flare up    Weight gain of more than 2 pounds in one day or 5 pounds in one week.    New swelling in ankle, leg, knee or thigh.    Bloating in belly, pants feel tighter.    Swelling in hands or face.    Coughing or trouble breathing while walking or talking.    Harder to breathe last night.    Have trouble sleeping, wake up short of breath.    Much more tired than usual.    Not eating.    Pain in my chest or bad leg cramps.    Feel weak or dizzy. Actions:    I need to take  action and call my doctor or nurse today.                 RED ZONE         Need medical care now    Weight gain of 5 pounds overnight.    Chest pain or pressure that does not go away.    Feel less alert.    Wheezing or have trouble breathing when at rest.    Cannot sleep lying down.    Cannot take my water pill.    Pass out or faint. Actions:    I need to call my doctor or nurse now!    Call 911 if I have chest pain or cannot breathe.

## 2018-03-20 NOTE — NURSING NOTE
"Chief Complaint   Patient presents with     Hypertension     recheck       Initial /70 (BP Location: Right arm, Patient Position: Chair, Cuff Size: Adult Regular)  Pulse 72  Temp 96.2  F (35.7  C) (Temporal)  SpO2 98% Estimated body mass index is 24.17 kg/(m^2) as calculated from the following:    Height as of 7/22/16: 5' 6.5\" (1.689 m).    Weight as of 11/21/17: 152 lb (68.9 kg).  Medication Reconciliation: complete   Loretta Mina CMA      Health Maintenance Due   Topic Date Due     HF ACTION PLAN Q3 YR  01/20/1925     ALT Q1 YR  11/25/2017     BMP Q6 MOS  01/12/2018       Health Maintenance reviewed at today's visit patient asked to schedule/complete:   Patient is aware.       "

## 2018-03-28 NOTE — TELEPHONE ENCOUNTER
----- Message from Luis Miguel Watson MD sent at 3/28/2018  4:39 PM CDT -----  Please call the patient with the results.  I would like to start him on some iron, his levels are low.  It  that okay ?  He would start 324 mg every other day.  Recheck lab levels in 2 months     Luis Miguel Watson MD

## 2018-05-10 NOTE — NURSING NOTE
Health Maintenance Due   Topic Date Due     ALT Q1 YR  11/25/2017       Health Maintenance reviewed at today's visit patient asked to schedule/complete:   Patient is aware.

## 2018-05-10 NOTE — PROGRESS NOTES
SUBJECTIVE:                                                      Chief Complaint   Patient presents with     Swelling     to the right side of the face.  Seen the dentist on saturday and they placed in him on amoxicillan             Tristen is a 89 year old with right-sided facial pain.  Seen by the dentist and thought to have a mild facial infection and started on amoxicillin.  He is looking much better.  They did say that it was not a dental infection    ROS:  Constitutional, HEENT, cardiovascular, pulmonary, gi and gu systems are negative, except as otherwise noted.    OBJECTIVE:                                                    /82 (BP Location: Right arm, Patient Position: Chair, Cuff Size: Adult Regular)  Pulse 71  Temp 98.6  F (37  C) (Temporal)  SpO2 96%  There is no height or weight on file to calculate BMI.    GENERAL: healthy, alert and no distress  NECK: no adenopathy, no asymmetry, masses, or scars and thyroid normal to palpation  RESP: lungs clear to auscultation - no rales, rhonchi or wheezes  CV: regular rate and rhythm, normal S1 S2, no S3 or S4, no murmur, click or rub, no peripheral edema and peripheral pulses strong  ABDOMEN: soft, nontender, no hepatosplenomegaly, no masses and bowel sounds normal  MS: no gross musculoskeletal defects noted, no edema    Diagnostic Test Results:  none      ASSESSMENT/PLAN:                                                        ICD-10-CM    1. Infection of parotid gland K11.20        Given the description, I wonder if he did not have a parotid infection.  It is resolving with amoxicillin which was a good choice.  He is doing well, discussed signs of worsening.  He will follow-up if that occurs.    Luis Miguel Watson MD  [facility]

## 2018-05-10 NOTE — MR AVS SNAPSHOT
After Visit Summary   5/10/2018    Tristen Canela    MRN: [id number]           Patient Information     Date Of Birth          1/20/1925        Visit Information        Provider Department      5/10/2018 10:40 AM Luis Miguel Watson MD [facility]         Follow-ups after your visit        Your next 10 appointments already scheduled     Jul 11, 2018  9:20 AM CDT   SHORT with Luis Miguel Watson MD   [facility] ([facility])    [address] [place] [id number] [phone]              Who to contact     If you have questions or need follow up information about today's clinic visit or your schedule please contact [facility] directly at [phone].  Normal or non-critical lab and imaging results will be communicated to you by MyChart, letter or phone within 4 business days after the clinic has received the results. If you do not hear from us within 7 days, please contact the clinic through [facility] or phone. If you have a critical or abnormal lab result, we will notify you by phone as soon as possible.  Submit refill requests through [employer] or call your pharmacy and they will forward the refill request to us. Please allow 3 business days for your refill to be completed.          Additional Information About Your Visit        MyChart Information     [employer] gives you secure access to your electronic health record. If you see a primary care provider, you can also send messages to your care team and make appointments. If you have questions, please call your primary care clinic.  If you do not have a primary care provider, please call [phone] and they will assist you.        Care EveryWhere ID     This is your Care EveryWhere ID. This could be used by other organizations to access your [facility] medical records  [id number]        Your Vitals Were     Pulse Temperature Pulse Oximetry             71 98.6  F (37  C)  (Temporal) 96%          Blood Pressure from Last 3 Encounters:   05/10/18 134/82   03/20/18 130/70   11/21/17 136/84    Weight from Last 3 Encounters:   11/21/17 152 lb (68.9 kg)   03/01/17 154 lb 11.2 oz (70.2 kg)   11/25/16 152 lb 9.6 oz (69.2 kg)              Today, you had the following     No orders found for display       Primary Care Provider Office Phone # Fax #    Luis Miguel Franck Watson [phone] [address] [place] [address]        Equal Access to Services     [facility]: Hadii aad ku hadasho Soomaali, waaxda luqadaha, qaybta kaalmada adeegyada, lloyd mendoza . So [facility] [phone].    ATENCIÓN: Si habla español, tiene a frank disposición servicios gratuitos de asistencia lingüística. Llame al [phone].    We comply with applicable federal civil rights laws and [place] laws. We do not discriminate on the basis of race, color, national origin, age, disability, sex, sexual orientation, or gender identity.            Thank you!     Thank you for choosing [facility]  for your care. Our goal is always to provide you with excellent care. Hearing back from our patients is one way we can continue to improve our services. Please take a few minutes to complete the written survey that you may receive in the mail after your visit with us. Thank you!             Your Updated Medication List - Protect others around you: Learn how to safely use, store and throw away your medicines at www.disposemymeds.org.          This list is accurate as of 5/10/18 11:52 AM.  Always use your most recent med list.                   Brand Name Dispense Instructions for use Diagnosis    aspirin 81 MG EC tablet     90 tablet    Take 81 mg by mouth daily    Coronary artery disease involving native coronary artery of native heart without angina pectoris       ferrous gluconate 324 (38 Fe) MG tablet    FERGON    100 tablet    Take 1 tablet (324 mg) by mouth every  other day    Low iron       lisinopril 5 MG tablet    PRINIVIL/ZESTRIL    45 tablet    TAKE ONE-HALF TABLET BY MOUTH ONCE DAILY    Benign essential hypertension       LUPRON 5 MG/ML Soln   Generic drug:  Leuprolide Acetate     1 mL    1 SHOT PER DR LARA EVERY FOUR MONTHS FOR PROSTATE CANCER    Prostate cancer (H)       metoprolol tartrate 25 MG tablet    LOPRESSOR    90 tablet    Take 0.5 tablets (12.5 mg) by mouth 2 times daily    Congestive heart failure, unspecified congestive heart failure chronicity, unspecified congestive heart failure type (H)       Multi-vitamin Tabs tablet   Generic drug:  multivitamin, therapeutic with minerals      Take 1 tablet by mouth daily.

## 2018-05-17 PROBLEM — S89.92XA KNEE INJURY, LEFT, INITIAL ENCOUNTER: Status: ACTIVE | Noted: 2018-01-01

## 2018-05-17 PROBLEM — M25.562 LEFT KNEE PAIN: Status: ACTIVE | Noted: 2018-01-01

## 2018-05-17 NOTE — ED PROVIDER NOTES
"  History     Chief Complaint   Patient presents with     Knee Injury     The history is provided by the patient.     Tristen Canela is a 89 year old male who presents to the emergency department after a left knee injury. Patient reports that he was on the lawnmower mowing the a steep ditch and was \"dumped\" off the lawnmower. He laid in the ditch until the mail lady and a passer  got him onto the lawnmower. Patient went home, his wife assisted him off of the lawnmower, together they fell to the ground because patient could not bear weight on the left leg. Patient was lifted into the truck to come here. He denies any hip pain, back pain, LOC, or head injury.    Problem List:    Patient Active Problem List    Diagnosis Date Noted     Coronary artery disease involving native coronary artery of native heart without angina pectoris 03/17/2012     Priority: High     Mitral valve insufficiency, unspecified etiology 06/17/2016     Priority: Medium     Generalized muscle weakness 06/17/2016     Priority: Medium     Chronic combined systolic and diastolic congestive heart failure (H) 05/26/2016     Priority: Medium     Dry bnp 2000       Benign essential hypertension 05/19/2016     Priority: Medium     Prostate cancer (H) 03/17/2012     Priority: Low     Anemia in chronic kidney disease 03/17/2012     Priority: Low     CKD (chronic kidney disease) stage 3, GFR 30-59 ml/min 03/17/2012     Priority: Low     Advance Care Planning 03/17/2012     Priority: Low     Advance Care Planning 3/29/2016: Receipt of ACP document:  Received: Health Care Directive which was witnessed or notarized on 10-12-15.  Document previously scanned on 2-26-16.  Validation form completed and sent to be scanned.  Code Status reflects choices in most recent ACP document and per documentation in ED Encounter by Dr Roldan wishes DNR/DNI dated 3-17-12.  Recommend goals of care discussion with patient/decision makers and POLST/ updated Code status " "order as applicable to reflect patient choices. .  Confirmed/documented designated decision maker(s).  Added by Annabelle Mackenzie RN Advance Care Planning Liaison with [employer]                Past Medical History:    Past Medical History:   Diagnosis Date     Basal cell carcinoma 06/2012     Prostate cancer (H) March 2010       Past Surgical History:    Past Surgical History:   Procedure Laterality Date     MOHS MICROGRAPHIC PROCEDURE  06/25/12    right nose lesion     PHACOEMULSIFICATION WITH STANDARD INTRAOCULAR LENS IMPLANT  4/18/2013    Procedure: PHACOEMULSIFICATION WITH STANDARD INTRAOCULAR LENS IMPLANT;  PHACOEMULSIFICATION WITH STANDARD INTRAOCULAR LENS IMPLANT RIGHT EYE;  Surgeon: Collin Booth MD;  Location: [facility] OR     TURP  08/26/10       Family History:    Family History   Problem Relation Age of Onset     Asthma Maternal Grandmother      Prostate Cancer Brother      CANCER Sister      ovarian     CANCER Sister      derm     HEART DISEASE Maternal Grandmother      CHF     OSTEOPOROSIS Mother      OSTEOPOROSIS Sister      OSTEOPOROSIS Sister        Social History:  Marital Status:   [2]  Social History   Substance Use Topics     Smoking status: Never Smoker     Smokeless tobacco: Never Used     Alcohol use No        Medications:      aspirin EC 81 MG EC tablet   ferrous gluconate (FERGON) 324 (38 FE) MG tablet   lisinopril (PRINIVIL/ZESTRIL) 5 MG tablet   LUPRON 5 MG/ML SC SOLN   metoprolol (LOPRESSOR) 25 MG tablet   Multiple Vitamin (MULTI-VITAMIN) per tablet         Review of Systems   Musculoskeletal: Negative for back pain.        No hip pain.   Neurological: Negative for syncope.   All other systems reviewed and are negative.      Physical Exam   BP: 119/67  Pulse: 110  Temp: 98  F (36.7  C)  Resp: 14  Height: 170.2 cm (5' 7\")  Weight: 68.9 kg (152 lb)  SpO2: 95 %      Physical Exam   Constitutional: No distress.   HENT:   Head: Normocephalic and atraumatic.   Mouth/Throat: " Oropharynx is clear and moist. No oropharyngeal exudate.   Eyes: Pupils are equal, round, and reactive to light. No scleral icterus.   Neck: Normal range of motion.   Cardiovascular: Normal rate, normal heart sounds and intact distal pulses.    Pulmonary/Chest: Breath sounds normal. No respiratory distress.   Abdominal: Soft. There is no tenderness.   Musculoskeletal: He exhibits edema and tenderness. He exhibits no deformity.   Swelling and decreased rom to the left knee   Neurological: He is alert. No cranial nerve deficit. Coordination normal.   Skin: Skin is warm. No rash noted. He is not diaphoretic.   Psychiatric: He has a normal mood and affect. His behavior is normal.       ED Course     ED Course     Procedures                 Results for orders placed or performed during the hospital encounter of 05/17/18 (from the past 24 hour(s))   XR Knee Left 3 Views    Narrative    KNEE LEFT THREE VIEWS    5/17/2018 5:56 PM     HISTORY: Knee pain after a fall.     COMPARISON: None.      Impression    IMPRESSION: Advanced medial and lateral compartment degenerative  changes. Moderate patellofemoral degenerative changes. Large joint  effusion. No evidence of acute fracture or subluxation. Vascular  calcifications typical of a diabetic patient.       Medications - No data to display    Assessments & Plan (with Medical Decision Making)  89-year-old with a left knee injury and swelling.  He is not safe for home given his new limitation in mobility.  He cannot bear weight.  He would not be safe to use a walker and I do not know if his house is set up to use a wheelchair.  I would like to admit him for PT/OT, rehabilitation.  Discussed with Dr. Coelho for admission and he agrees. Since this was a trauma admission I discussed it with Dr. Grove who agrees with the admission. The diagnosis, treatment options, recommended admission/transfer discussed with the patient and/or family who agree with the plan as outlined.     I have  reviewed the nursing notes.    I have reviewed the findings, diagnosis, plan and need for follow up with the patient.      New Prescriptions    No medications on file       Final diagnoses:   Knee injury, left, initial encounter     This document serves as a record of services personally performed by David Evans MD. It was created on their behalf by Tala Malone, a trained medical scribe. The creation of this record is based on the provider's personal observations and the statements of the patient. This document has been checked and approved by the attending provider.  Note: Chart documentation done in part with Dragon Voice Recognition software. Although reviewed after completion, some word and grammatical errors may remain.  5/17/2018   [facility] EMERGENCY DEPARTMENT     David Evans MD  05/17/18 8251

## 2018-05-17 NOTE — LETTER
Transition Communication Hand-off for Care Transitions to Next Level of Care Provider    Name: Tristen Canela  : 1925  MRN #: [id number]  Primary Care Provider: Luis Miguel Watson  Primary Care MD Name: Dr. Watson   Primary Clinic: [address] [place] [address] [place] [address]  Primary Care Clinic Name: [facility]   Reason for Hospitalization:  Knee injury, left, initial encounter [S89.92XA]  Admit Date/Time: 2018  5:19 PM  Discharge Date: 18  Payor Source: Payor: MEDICA / Plan: MEDICA PRIME SOLUTION / Product Type: Indemnity /     Readmission Assessment Measure (JOSUÉ) Risk Score/category: None- Observation status     Plan of Care Goals/Milestone Events:   Patient Concerns: none    Patient Goals:   Short-term - TCU at [facility]   Long-term -Return home with wife    Medical Goals   Short-term - knee to heal   Long-term - resume prior activities          Reason for Communication Hand-off Referral: Fragility  Other going to TCU at [facility]     Discharge Plan: TCU at [facility]        Concern for non-adherence with plan of care:   Y/N no   Discharge Needs Assessment:  Needs       Most Recent Value    Transportation Available van, wheelchair accessible    # of Referrals Placed by CTS Post Acute Facilities, Transportation    Skilled Nursing Facility [facility] [phone], Fax: [phone]          Already enrolled in Tele-monitoring program and name of program:  no   Follow-up specialty is recommended: Yes    Follow-up plan:  Future Appointments  Date Time Provider Department Center   2018 9:20 AM Luis Miguel Watson MD [facility]       Any outstanding tests or procedures:        Referrals     Future Labs/Procedures    Physical Therapy Adult Consult     Comments:    Evaluate and treat as clinically indicated.    Reason:  Left knee strain            Key Recommendations:  Attend any follow up appointments.     AISLINN HILTON    AVS/Discharge Summary is the source of  truth; this is a helpful guide for improved communication of patient story

## 2018-05-17 NOTE — IP AVS SNAPSHOT
` `     [address] SURGICAL: [phone]            Medication Administration Report for Tristen Canela as of 05/19/18 1013   Legend:    Given Hold Not Given Due Canceled Entry Other Actions    Time Time (Time) Time  Time-Action       Inactive    Active    Linked        Medications 05/13/18 05/14/18 05/15/18 05/16/18 05/17/18 05/18/18 05/19/18    acetaminophen (TYLENOL) tablet 650 mg  Dose: 650 mg  Freq: EVERY 4 HOURS PRN Route: PO  PRN Reason: mild pain  Start: 05/17/18 2129   Admin Instructions: Alternate ibuprofen (if ordered) with acetaminophen.  Maximum acetaminophen dose from all sources = 75 mg/kg/day not to exceed 4 grams/day.    Admin. Amount: 2 tablet (2 × 325 mg tablet)  Last Admin: 05/18/18 1758  Dispense Loc: [facility] ADS Med Surg         2156 (650 mg)-Given        1252 (650 mg)-Given       1758 (650 mg)-Given            aspirin EC tablet 81 mg  Dose: 81 mg  Freq: DAILY Route: PO  Start: 05/18/18 0900   Admin Instructions: DO NOT CRUSH.    Admin. Amount: 1 tablet (1 × 81 mg tablet)  Last Admin: 05/19/18 1001  Dispense Loc: [facility] ADS Med Surg          1046 (81 mg)-Given        1001 (81 mg)-Given           lisinopril (PRINIVIL/Zestril) tablet 2.5 mg  Dose: 2.5 mg  Freq: DAILY Route: PO  Start: 05/18/18 0900   Admin. Amount: 1 tablet (1 × 2.5 mg tablet)  Last Admin: 05/19/18 1001  Dispense Loc: [facility] ADS Med Surg          1046 (2.5 mg)-Given        1001 (2.5 mg)-Given           melatonin tablet 1 mg  Dose: 1 mg  Freq: AT BEDTIME PRN Route: PO  PRN Reason: sleep  Start: 05/17/18 2126   Admin Instructions: Do not give unless at least 6 hours of uninterrupted sleep is expected.    Admin. Amount: 1 tablet (1 × 1 mg tablet)  Dispense Loc: [facility] ADS Med Surg               metoprolol tartrate (LOPRESSOR) half-tab 12.5 mg  Dose: 12.5 mg  Freq: 2 TIMES DAILY Route: PO  Start: 05/17/18 2130   Admin. Amount: 1 half-tab (1 × 12.5 mg half-tab)  Last Admin: 05/19/18 1002  Dispense Loc: [facility] Med Surg          2156 (12.5 mg)-Given        1045 (12.5 mg)-Given       2025 (12.5 mg)-Given        1002 (12.5 mg)-Given       [ ] 2100           naloxone (NARCAN) injection 0.1-0.4 mg  Dose: 0.1-0.4 mg  Freq: EVERY 2 MIN PRN Route: IV  PRN Reason: opioid reversal  Start: 05/17/18 2129   Admin Instructions: For respiratory rate LESS than or EQUAL to 8.  Partial reversal dose:  0.1 mg titrated q 2 minutes for Analgesia Side Effects Monitoring Sedation Level of 3 (frequently drowsy, arousable, drifts to sleep during conversation).Full reversal dose:  0.4 mg bolus for Analgesia Side Effects Monitoring Sedation Level of 4 (somnolent, minimal or no response to stimulation).  For ordered doses up to 2mg give IVP. Give each 0.4mg over 15 seconds in emergency situations. For non-emergent situations further dilute in 9mL of NS to facilitate titration of response.    Admin. Amount: 0.1-0.4 mg = 0.25-1 mL Conc: 0.4 mg/mL  Dispense Loc: [facility] ADS Med Surg  Volume: 1 mL               ondansetron (ZOFRAN-ODT) ODT tab 4 mg  Dose: 4 mg  Freq: EVERY 6 HOURS PRN Route: PO  PRN Reasons: nausea,vomiting  Start: 05/17/18 2129   Admin Instructions: This is Step 1 of nausea and vomiting management.  If nausea not resolved in 15 minutes, go to Step 2 prochlorperazine (COMPAZINE). Do not push through foil backing. Peel back foil and gently remove. Place on tongue immediately. Administration with liquid unnecessary  With dry hands, peel back foil backing and gently remove tablet; do not push oral disintegrating tablet through foil backing; administer immediately on tongue and oral disintegrating tablet dissolves in seconds; then swallow with saliva; liquid not required.    Admin. Amount: 1 tablet (1 × 4 mg tablet)  Last Admin: 05/18/18 1222  Dispense Loc: [facility] ADS Med Surg                 1222 (4 mg)-Given           Or  ondansetron (ZOFRAN) injection 4 mg  Dose: 4 mg  Freq: EVERY 6 HOURS PRN Route: IV  PRN Reasons: nausea,vomiting  Start: 05/17/18 2129    Admin Instructions: This is Step 1 of nausea and vomiting management.  If nausea not resolved in 15 minutes, go to Step 2 prochlorperazine (COMPAZINE).  Irritant. For ordered doses up to 4 mg, give IV Push undiluted over 2-5 minutes.    Admin. Amount: 4 mg = 2 mL Conc: 4 mg/2 mL  Dispense Loc: [facility] ADS Med Surg  Infused Over: 2-5 Minutes  Volume: 2 mL          (1211)-Not Given                  Discontinued Medications  Medications 05/13/18 05/14/18 05/15/18 05/16/18 05/17/18 05/18/18 05/19/18         Dose: 650 mg  Freq: EVERY 4 HOURS PRN Route: PO  PRN Reason: mild pain  Start: 05/17/18 2126   End: 05/17/18 2136   Admin Instructions: Alternate ibuprofen (if ordered) with acetaminophen.  Maximum acetaminophen dose from all sources = 75 mg/kg/day not to exceed 4 grams/day.    Admin. Amount: 2 tablet (2 × 325 mg tablet)         2136-Med Discontinued           Dose: 0.1-0.4 mg  Freq: EVERY 2 MIN PRN Route: IV  PRN Reason: opioid reversal  Start: 05/17/18 2126   End: 05/17/18 2132   Admin Instructions: For respiratory rate LESS than or EQUAL to 8.  Partial reversal dose:  0.1 mg titrated q 2 minutes for Analgesia Side Effects Monitoring Sedation Level of 3 (frequently drowsy, arousable, drifts to sleep during conversation).Full reversal dose:  0.4 mg bolus for Analgesia Side Effects Monitoring Sedation Level of 4 (somnolent, minimal or no response to stimulation).  For ordered doses up to 2mg give IVP. Give each 0.4mg over 15 seconds in emergency situations. For non-emergent situations further dilute in 9mL of NS to facilitate titration of response.    Admin. Amount: 0.1-0.4 mg = 0.25-1 mL Conc: 0.4 mg/mL  Volume: 1 mL         2132-Med Discontinued           Dose: 4 mg  Freq: EVERY 6 HOURS PRN Route: PO  PRN Reasons: nausea,vomiting  Start: 05/17/18 2126   End: 05/17/18 2136   Admin Instructions: This is Step 1 of nausea and vomiting management.  If nausea not resolved in 15 minutes, go to Step 2 prochlorperazine  (COMPAZINE). Do not push through foil backing. Peel back foil and gently remove. Place on tongue immediately. Administration with liquid unnecessary  With dry hands, peel back foil backing and gently remove tablet; do not push oral disintegrating tablet through foil backing; administer immediately on tongue and oral disintegrating tablet dissolves in seconds; then swallow with saliva; liquid not required.    Admin. Amount: 1 tablet (1 × 4 mg tablet)         2136-Med Discontinued        Or    Dose: 4 mg  Freq: EVERY 6 HOURS PRN Route: IV  PRN Reasons: nausea,vomiting  Start: 05/17/18 2126   End: 05/17/18 2136   Admin Instructions: This is Step 1 of nausea and vomiting management.  If nausea not resolved in 15 minutes, go to Step 2 prochlorperazine (COMPAZINE).  Irritant. For ordered doses up to 4 mg, give IV Push undiluted over 2-5 minutes.    Admin. Amount: 4 mg = 2 mL Conc: 4 mg/2 mL  Infused Over: 2-5 Minutes  Volume: 2 mL         2136-Med Discontinued

## 2018-05-17 NOTE — IP AVS SNAPSHOT
"[address] MEDICAL SURGICAL: [phone]                                              INTERAGENCY TRANSFER FORM - PHYSICIAN ORDERS   2018                    Hospital Admission Date: 2018  DELVIN MEAD   : 1925  Sex: Male        Attending Provider: David Coelho MD     Allergies:  No Known Drug Allergies    Infection:  None   Service:  HOSPITALIST    Ht:  1.702 m (5' 7\")   Wt:  66.5 kg (146 lb 9.7 oz)   Admission Wt:  68.9 kg (152 lb)    BMI:  22.96 kg/m 2   BSA:  1.77 m 2            Patient PCP Information     Provider PCP Type    Luis Miguel Watson MD General      ED Clinical Impression     Diagnosis Description Comment Added By Time Added    Knee injury, left, initial encounter [S89.92XA] Knee injury, left, initial encounter [S89.92XA]  David Evans MD 2018  6:33 PM      Hospital Problems as of 2018              Priority Class Noted POA    Coronary artery disease involving native coronary artery of native heart without angina pectoris High  3/17/2012 Yes    Prostate cancer (H) Low  3/17/2012 Yes    Anemia in chronic kidney disease Low  3/17/2012 Yes    CKD (chronic kidney disease) stage 3, GFR 30-59 ml/min Low  3/17/2012 Yes    Benign essential hypertension Medium  2016 Yes    Chronic combined systolic and diastolic congestive heart failure (H) Medium  2016 Yes    Mitral valve insufficiency, unspecified etiology Medium  2016 Yes    Rupture of anterior cruciate ligament of left knee Medium  2018 Yes    * (Principal)Nondisplaced fracture of medial condyle of femur (H) Medium  2018 Yes    Urinary retention Medium  2018 Yes      Non-Hospital Problems as of 2018              Priority Class Noted    Advance Care Planning Low  3/17/2012    Generalized muscle weakness Medium  2016      Code Status History     Date Active Date Inactive Code Status Order ID Comments User Context    2018  5:50 AM  DNR/DNI " [id number]  David Coelho MD Outpatient    5/17/2018  9:26 PM 5/18/2018  5:50 AM DNR/DNI [id number]  David Coelho MD Inpatient    8/24/2016  1:19 PM 5/17/2018  9:26 PM DNR/DNI [id number]  Luis Miguel Watson MD Outpatient    3/17/2012 11:24 AM 3/20/2012  5:13 PM DNR/DNI [id number]  David Coelho MD Inpatient    3/17/2012  6:56 AM 3/17/2012 10:16 AM Special Code [id number] Parameters: Full code until addressed by Inpatient Physician. Yomi Allen RN Inpatient         Medication Review      START taking        Dose / Directions Comments    acetaminophen 325 MG tablet   Commonly known as:  TYLENOL        Dose:  650 mg   Take 2 tablets (650 mg) by mouth every 4 hours as needed for mild pain   Quantity:  100 tablet   Refills:  0          CONTINUE these medications which may have CHANGED, or have new prescriptions. If we are uncertain of the size of tablets/capsules you have at home, strength may be listed as something that might have changed.        Dose / Directions Comments    lisinopril 5 MG tablet   Commonly known as:  PRINIVIL/ZESTRIL   This may have changed:  See the new instructions.   Used for:  Benign essential hypertension        TAKE ONE-HALF TABLET BY MOUTH ONCE DAILY   Quantity:  45 tablet   Refills:  3          CONTINUE these medications which have NOT CHANGED        Dose / Directions Comments    aspirin 81 MG EC tablet   Used for:  Coronary artery disease involving native coronary artery of native heart without angina pectoris        Dose:  81 mg   Take 1 tablet (81 mg) by mouth daily   Quantity:  90 tablet   Refills:  1        ferrous gluconate 324 (38 Fe) MG tablet   Commonly known as:  FERGON   Used for:  Low iron        Dose:  324 mg   Take 1 tablet (324 mg) by mouth every other day   Quantity:  100 tablet   Refills:  0        LUPRON 5 MG/ML Soln   Used for:  Prostate cancer (H)   Generic drug:  Leuprolide Acetate        1 SHOT PER DR LARA EVERY FOUR MONTHS FOR PROSTATE  CANCER   Quantity:  1 mL   Refills:  0        metoprolol tartrate 25 MG tablet   Commonly known as:  LOPRESSOR   Used for:  Congestive heart failure, unspecified congestive heart failure chronicity, unspecified congestive heart failure type (H)        Dose:  12.5 mg   Take 0.5 tablets (12.5 mg) by mouth 2 times daily   Quantity:  90 tablet   Refills:  3          STOP taking     Multi-vitamin Tabs tablet   Generic drug:  multivitamin, therapeutic with minerals                   After Care     Activity - Up with nursing assistance       Apply left knee immobilizer when up, may weight bear as tolerated       Advance Diet as Tolerated       Follow this diet upon discharge: Regular       Fall precautions           General info for SNF       Length of Stay Estimate: Short Term Care: Estimated # of Days <30  Condition at Discharge: Stable  Level of care:skilled   Rehabilitation Potential: Good  Admission H&P remains valid and up-to-date: Yes  Recent Chemotherapy: N/A  Use Nursing Home Standing Orders: Yes       Mantoux instructions       Give two-step Mantoux (PPD) Per Facility Policy Yes             Referrals     Physical Therapy Adult Consult       Evaluate and treat as clinically indicated.    Reason:  Left knee strain             Your next 10 appointments already scheduled     Jul 11, 2018  9:20 AM CDT   [facility] with Luis Miguel Watson MD   [facility]    [address] [place] [id number] [phone]              Follow-Up Appointment Instructions     Future Labs/Procedures    Follow Up and recommended labs and tests     Comments:    Follow up with Dr. Grove of Orthopedics within one week, follow up with NH provider      Follow-Up Appointment Instructions     Follow Up and recommended labs and tests       Follow up with Dr. Grove of Orthopedics within one week, follow up with NH provider             Statement of Approval     Ordered          05/19/18 1000  I  have reviewed and agree with all the recommendations and orders detailed in this document.  EFFECTIVE NOW     Approved and electronically signed by:  Eusebio Melgar MD

## 2018-05-17 NOTE — IP AVS SNAPSHOT
"          [address] SURGICAL: [phone]                                              INTERAGENCY TRANSFER FORM - LAB / IMAGING / EKG / EMG RESULTS   2018                    Hospital Admission Date: 2018  DELVIN MEAD   : 1925  Sex: Male        Attending Provider: David Coelho MD     Allergies:  No Known Drug Allergies    Infection:  None   Service:  HOSPITALIST    Ht:  1.702 m (5' 7\")   Wt:  66.5 kg (146 lb 9.7 oz)   Admission Wt:  68.9 kg (152 lb)    BMI:  22.96 kg/m 2   BSA:  1.77 m 2            Patient PCP Information     Provider PCP Type    Luis Miguel Watson MD General         Lab Results - 3 Days      Hemoglobin [[id number]] (Abnormal)  Resulted: 18 2154, Result status: Final result    Ordering provider: Eusebio Melgar MD  18 1600 Resulting lab: [facility]    Specimen Information    Type Source Collected On   Blood  18 2146          Components       Value Reference Range Flag Lab   Hemoglobin 10.7 13.3 - 17.7 g/dL L [facility]            Hemoglobin [[id number]] (Abnormal)  Resulted: 18 1412, Result status: Final result    Ordering provider: Eusebio Melgar MD  18 1325 Resulting lab: [facility]    Specimen Information    Type Source Collected On   Blood  18 1402          Components       Value Reference Range Flag Lab   Hemoglobin 10.3 13.3 - 17.7 g/dL L [facility]            Occult blood gastric [[id number]] (Abnormal)  Resulted: 18 1000, Result status: Final result    Ordering provider: Eusebio Melgar MD  18 0948 Resulting lab: [facility]    Specimen Information    Type Source Collected On   Gastric fluid  18 0820          Components       Value Reference Range Flag Lab   Gastric Occult Positive NEG^Negative A [facility]   Gastric Occult pH 3.0 pH  [facility]            Basic metabolic panel [[id number]] (Abnormal)  Resulted: 18 0715, " Result status: Final result    Ordering provider: David Coelho MD  05/18/18 0634 Resulting lab: [facility]    Specimen Information    Type Source Collected On   Blood  05/18/18 0651          Components       Value Reference Range Flag Lab   Sodium 139 133 - 144 mmol/L  [facility]   Potassium 4.0 3.4 - 5.3 mmol/L  [facility]   Chloride 105 94 - 109 mmol/L  [facility]   Carbon Dioxide 26 20 - 32 mmol/L  [facility]   Anion Gap 8 3 - 14 mmol/L  [facility]   Glucose 150 70 - 99 mg/dL H [facility]   Urea Nitrogen 35 7 - 30 mg/dL H [facility]   Creatinine 1.81 0.66 - 1.25 mg/dL H [facility]   GFR Estimate 35 >60 mL/min/1.7m2 L [facility]   Comment:  Non  GFR Calc   GFR Estimate If Black 43 >60 mL/min/1.7m2 L [facility]   Comment:  African American GFR Calc   Calcium 8.5 8.5 - 10.1 mg/dL  [facility]            CBC with platelets [[id number]] (Abnormal)  Resulted: 05/18/18 0657, Result status: Final result    Ordering provider: David Coelho MD  05/18/18 0634 Resulting lab: [facility]    Specimen Information    Type Source Collected On   Blood  05/18/18 0651          Components       Value Reference Range Flag Lab   WBC 13.0 4.0 - 11.0 10e9/L H [facility]   RBC Count 3.51 4.4 - 5.9 10e12/L L [facility]   Hemoglobin 10.4 13.3 - 17.7 g/dL L [facility]   Hematocrit 32.6 40.0 - 53.0 % L [facility]   MCV 93 78 - 100 fl  [facility]   MCH 29.6 26.5 - 33.0 pg  [facility]   MCHC 31.9 31.5 - 36.5 g/dL  [facility]   RDW 14.9 10.0 - 15.0 %  [facility]   Platelet Count 294 150 - 450 10e9/L  [facility]            Testing Performed By     Lab - Abbreviation Name Director Address Valid Date Range    22 - [facility] Unknown [address] [place] [address] 05/08/15 1057 - Present            Unresulted Labs     None         Imaging Results - 3 Days      MR Knee Left w/o Contrast [[id number]]  Resulted: 05/19/18 0828, Result status: Final result    Ordering provider: Eusebio Grove,  DO  05/18/18 1139 Resulted by: Jean-Paul Gupta MD    Performed: 05/18/18 1852 - 05/18/18 1947 Resulting lab: RADIOLOGY RESULTS    Narrative:       MR LEFT KNEE WITHOUT CONTRAST   5/18/2018  7:47 PM    HISTORY: Left knee pain since 5/17/2018 following an injury. Evaluate  for quadriceps tendon rupture.    COMPARISON: Radiographs on 5/17/2018.    TECHNIQUE: Multiplanar MR imaging was performed without contrast.    FINDINGS:     Medial Meniscus: There is extensive maceration of the majority of the  meniscus.    Lateral Meniscus: There is moderate maceration of the meniscus.    Anterior Cruciate Ligament: Completely torn.    Posterior Cruciate Ligament: Unremarkable.    Medial Collateral Ligament: Unremarkable.    Lateral Collateral Ligament Complex, Popliteus Tendon: The iliotibial  band, fibular collateral ligament, biceps femoris tendon, and  popliteus tendon are unremarkable.    Osseous and Cartilaginous Structures: There is a nondisplaced fracture  along the periphery of the medial femoral condyle. This measures  approximately 4.0 cm craniocaudal x 5.0 cm AP x 0.8 cm transverse.  There is mild adjacent marrow edema. However, the margins of the  fracture line are somewhat smooth and this may not be acute. No other  fracture or osseous lesion is demonstrated. There is advanced  degenerative change with mixed grade 3 and grade IV chondromalacia  throughout the medial and lateral compartments with grade II and grade  III chondromalacia throughout the patellofemoral joint.    Extensor Mechanism: The quadriceps and patellar tendons are  unremarkable. The medial and lateral patellar retinacula appear  unremarkable.    Joint Space: There is a very large joint effusion. No definite loose  bodies appreciated.    Additional Findings: There is a few cm wide Baker's cyst. No  semimembranosus-tibial collateral ligament or pes anserine bursitis.  No adjacent soft tissue pathology is seen.      Impression:       IMPRESSION:    1. Complete tear of the anterior cruciate ligament.  2. Nondisplaced fracture along the periphery of the medial femoral  condyle. Although there is mild marrow edema, the fracture line is  somewhat smooth and this might not be acute. Clinical correlation is  recommended as this could be subacute in age.  3. No quadriceps tendon tear is seen.  4. Extensive degenerative changes of the medial and lateral  compartments including maceration of the menisci.    LAN MIRZA MD      XR Knee Left 3 Views [[id number]]  Resulted: 05/17/18 2034, Result status: Final result    Ordering provider: David Evans MD  05/17/18 1739 Resulted by: Jose R Goetz MD    Performed: 05/17/18 1748 - 05/17/18 1756 Resulting lab: RADIOLOGY RESULTS    Narrative:       KNEE LEFT THREE VIEWS    5/17/2018 5:56 PM     HISTORY: Knee pain after a fall.     COMPARISON: None.      Impression:       IMPRESSION: Advanced medial and lateral compartment degenerative  changes. Moderate patellofemoral degenerative changes. Large joint  effusion. No evidence of acute fracture or subluxation. Vascular  calcifications typical of a diabetic patient.    JOSE R GOETZ MD      Testing Performed By     Lab - Abbreviation Name Director Address Valid Date Range    104 - Rad [facility] RADIOLOGY RESULTS Unknown Unknown 02/16/05 1553 - Present            Encounter-Level Documents:     There are no encounter-level documents.      Order-Level Documents:     There are no order-level documents.

## 2018-05-17 NOTE — IP AVS SNAPSHOT
` `       Patient Information     Patient Name Sex     Tristen Canela ([id number]) Male 1925       Room Bed    249 249-01      Patient Demographics     Address Phone E-mail Address    [address] [place] [address] [phone] (Home)  [phone] (Mobile) [email or web address]      Patient Ethnicity & Race     Ethnic Group Patient Race    American White      Emergency Contact(s)     Name Relation Home Work Mobile    Tara Canela Spouse [phone]      Ana olea Daughter [phone]        Documents on File        Status Date Received Description       Documents for the Patient    Privacy Notice - [place] Received 05     Face Sheet Received () 03/08/10     Insurance Card Received 03/08/10 Medicare    External Medication Information Consent Accepted () 03/08/10     Insurance Card Received () 03/08/10     Patient ID Received () 14     External Medication Information Consent Accepted () 11     Insurance Card Received () 08/15/11     Consent for Services - Hospital/Clinic Received () 12     External Medication Information Consent Accepted () 12     HIM KJ Authorization - File Only   KJ Authorization to Discuss PHI 04.20.12  Kev Pacheco    Insurance Card Received () 13 medica    Consent for EHR Access  13 Copied from existing Consent for services - C/HOD collected on 2012    [facility] Specified Other       Consent for EHR Access Received 13     Consent for Services - Hospital/Clinic Received () 13     External Medication Information Consent Accepted 13     Insurance Card Received () 14 medica    Consent for Services - Hospital/Clinic Received () 14     Consent for Services - Hospital/Clinic Received () 04/20/15     Consent for Services/Privacy Notice - Hospital/Clinic Received () 02/15/16     Advance Directives and Living  Will Received 16 HEALTH CARE DIRECTIVE 10-12-15    Advance Directives and Living Will Not Received  VALIDATION OF AD 10-12-15    HIM KJ Authorization - File Only  16 [facility] [place] - 16    Insurance Card Received () 16 [facility]    HIM KJ Authorization - File Only  05/10/16 [facility] [place] 16    Patient ID Received () 16    Consent for Services/Privacy Notice - Hospital/Clinic Received () 17     Insurance Card Received () 17 medica    Patient ID Received 17     Care Everywhere Prospective Auth Received 17     Insurance Card Received 18 [employer]    Consent for Services/Privacy Notice - Hospital/Clinic Received 18     Consent for Services - Hospital and Clinic Received 05/10/18     HIE Auth Received 05/10/18     Consent for Services - Hospital/Clinic Received (Deleted) 12/03/10     Consent for Services - Hospital/Clinic Received (Deleted) 11        Documents for the Encounter    CMS IM for Patient Signature         Admission Information     Attending Provider Admitting Provider Admission Type Admission Date/Time    David Coelho MD Fordahl, Daniel Dean, MD Emergency 18  1719    Discharge Date Hospital Service Auth/Cert Status Service Area     Hospitalist Incomplete [facility] SERVICES    Unit Room/Bed Admission Status       PH 2A MEDICAL SURGICAL 249/249-01 Admission (Confirmed)       Admission     Complaint    Left knee pain, Knee injury, left, initial encounter      Hospital Account     Name Acct ID Class Status Primary Coverage    Tristen Canela [id number] Observation Open MEDICARE - MEDICARE FOR HB SUPPLEMENT            Guarantor Account (for Hospital Account #[id number])     Name Relation to Pt Service Area Active? Acct Type    Tristen Canela Self FCS Yes Personal/Family    Address Phone          [address] [place] [address] [phone](H)               Coverage Information (for Hospital Account #[id number])     1. MEDICARE/MEDICARE FOR HB SUPPLEMENT     F/O Payor/Plan Precert #    MEDICARE/MEDICARE FOR HB SUPPLEMENT     Subscriber Subscriber #    Tristen Canela [id number]    Address Phone    [employer] CLAIMS  [address]  [place] [address] [phone]          2. MEDICA/MEDICA PRIME SOLUTION     F/O Payor/Plan Precert #    MEDICA/MEDICA PRIME SOLUTION     Subscriber Subscriber #    Tristen Canela [id number]    Address Phone    [address] [address]  [place] [address] [phone]

## 2018-05-17 NOTE — IP AVS SNAPSHOT
"` `     [address] MEDICAL SURGICAL: [phone]                                              INTERAGENCY TRANSFER FORM - NURSING   2018                    Hospital Admission Date: 2018  DELVIN MEAD   : 1925  Sex: Male        Attending Provider: David Coelho MD     Allergies:  No Known Drug Allergies    Infection:  None   Service:  HOSPITALIST    Ht:  1.702 m (5' 7\")   Wt:  66.5 kg (146 lb 9.7 oz)   Admission Wt:  68.9 kg (152 lb)    BMI:  22.96 kg/m 2   BSA:  1.77 m 2            Patient PCP Information     Provider PCP Type    Luis Miguel Watson MD General      Current Code Status     Date Active Code Status Order ID Comments User Context       Prior      Code Status History     Date Active Date Inactive Code Status Order ID Comments User Context    2018  5:50 AM  DNR/DNI [id number]  David Coelho MD Outpatient    2018  9:26 PM 2018  5:50 AM DNR/DNI [id number]  David Coelho MD Inpatient    2016  1:19 PM 2018  9:26 PM DNR/DNI [id number]  Luis Miguel Watson MD Outpatient    3/17/2012 11:24 AM 3/20/2012  5:13 PM DNR/DNI [id number]  David Coelho MD Inpatient    3/17/2012  6:56 AM 3/17/2012 10:16 AM Special Code [id number] Parameters: Full code until addressed by Inpatient Physician. Yomi Allen RN Inpatient      Advance Directives        Scanned docmt in ACP Activity?           Yes, scanned ACP docmt        Hospital Problems as of 2018              Priority Class Noted POA    Coronary artery disease involving native coronary artery of native heart without angina pectoris High  3/17/2012 Yes    Prostate cancer (H) Low  3/17/2012 Yes    Anemia in chronic kidney disease Low  3/17/2012 Yes    CKD (chronic kidney disease) stage 3, GFR 30-59 ml/min Low  3/17/2012 Yes    Benign essential hypertension Medium  2016 Yes    Chronic combined systolic and diastolic congestive heart failure (H) Medium  2016 Yes    Mitral " "valve insufficiency, unspecified etiology Medium  6/17/2016 Yes    Rupture of anterior cruciate ligament of left knee Medium  5/17/2018 Yes    * (Principal)Nondisplaced fracture of medial condyle of femur (H) Medium  5/17/2018 Yes    Urinary retention Medium  5/19/2018 Yes      Non-Hospital Problems as of 5/19/2018              Priority Class Noted    Advance Care Planning Low  3/17/2012    Generalized muscle weakness Medium  6/17/2016      Immunizations     Name Date      Influenza (High Dose) 3 valent vaccine 11/21/17     Influenza (High Dose) 3 valent vaccine 11/25/16     Influenza (IIV3) PF 11/10/13     Influenza (IIV3) PF 03/18/12     Pneumo Conj 13-V (2010&after) 07/12/17     Pneumococcal 23 valent 03/18/12     TDAP Vaccine (Adacel) 07/12/17     Zoster vaccine, live 10/29/12          END      ASSESSMENT     Discharge Profile Flowsheet     DISCHARGE NEEDS ASSESSMENT     All Quadrants Bowel Sounds  audible and normoactive 05/19/18 0122    Concerns To Be Addressed  discharge planning concerns 03/17/12 1132   Last Bowel Movement  -- (\"I'm not sure, but it's been a while.\") 05/18/18 0651    Concerns Comments  patient usually independent at home.  Does not receive any services at this time 03/17/12 1132   GI Signs/Symptoms  vomiting 05/18/18 1843    Transportation Available  car;family or friend will provide 05/18/18 1339   COMMUNICATION ASSESSMENT      FUNCTIONAL LEVEL CURRENT     Patient's communication style  spoken language (English or Bilingual) 05/17/18 1711    Ambulation  3 - assistive equipment and person 05/19/18 1000   SKIN      Transferring  2 - assistive person 05/19/18 1000   Inspection of bony prominences  Full 05/19/18 0118    Toileting  3 - assistive equipment and person 05/19/18 1000   Skin WDL  ex 05/19/18 0118    Bathing  3 - assistive equipment and person 05/19/18 1000   Skin Color/Characteristics  redness blanchable;bruised (ecchymotic) (blanchable redness on coccyx) 05/19/18 0122    Dressing  2 " "- assistive person 05/19/18 1000   Skin Temperature  warm 05/19/18 0118    Eating  0 - independent 05/19/18 1000   Skin Moisture  dry 05/19/18 0118    Communication  0 - understands/communicates without difficulty 05/19/18 1000   Skin Elasticity  quick return to original state 05/18/18 1056    Swallowing  0 - swallows foods/liquids without difficulty 05/19/18 1000   Skin Integrity  abrasion(s);scab(s) (left knee, top of head) 05/19/18 0122    Current Functional Level Comment  heavy 2 assist for transfers 05/19/18 1000   SAFETY      GASTROINTESTINAL (ADULT,PEDIATRIC,OB)     Safety WDL  WDL 05/19/18 0118    GI WDL  ex 05/19/18 0118   All Alarms  alarm(s) activated and audible 05/19/18 0125                 Assessment WDL (Within Defined Limits) Definitions           Safety WDL     Effective: 09/28/15    Row Information: <b>WDL Definition:</b> Bed in low position, wheels locked; call light in reach; upper side rails up x 2; ID band on<br> <font color=\"gray\"><i>Item=AS safety wdl>>List=AS safety wdl>>Version=F14</i></font>      Skin WDL     Effective: 09/28/15    Row Information: <b>WDL Definition:</b> Warm; dry; intact; elastic; without discoloration; pressure points without redness<br> <font color=\"gray\"><i>Item=AS skin wdl>>List=AS skin wdl>>Version=F14</i></font>      Vitals     Vital Signs Flowsheet     VITAL SIGNS     Height Method  Stated 05/17/18 1719    Temp  98.5  F (36.9  C) 05/19/18 0901   Weight  66.5 kg (146 lb 9.7 oz) 05/17/18 2132    Temp src  Oral 05/19/18 0901   BSA (Calculated - sq m)  1.77 05/17/18 2132    Resp  18 05/19/18 0901   BMI (Calculated)  23.01 05/17/18 2132    Pulse  99 05/19/18 0943   POSITIONING      Pulse/Heart Rate Source  Monitor 05/19/18 0520   Body Position  independently positioning 05/19/18 0125    BP  119/71 05/19/18 0943   Head of Bed (HOB)  HOB at 20-30 degrees 05/19/18 0125    BP Location  Right arm 05/19/18 0901   DAILY CARE      OXYGEN THERAPY     Activity Management  " "activity adjusted per tolerance 05/19/18 0125    SpO2  93 % 05/19/18 0901   Activity Assistance Provided  assistance, 2 people 05/19/18 0125    O2 Device  None (Room air) 05/19/18 0901   CLINICALLY ALIGNED PAIN ASSESSMENT (CAPA) ([facility], [facility] AND [facility] ADULTS ONLY)      PAIN/COMFORT     Comfort  comfortably manageable 05/18/18 1410    Patient Currently in Pain  denies 05/19/18 0117   Change in Pain  getting better 05/18/18 1410    0-10 Pain Scale  3 05/17/18 2119   Pain Control  fully effective 05/18/18 1410    Pain Location  Knee 05/17/18 2328   Functioning  can do most things, but pain gets in the way of some (mobility issue d/t injury from today) 05/17/18 2119    Pain Orientation  Left 05/17/18 2328   DEJUAN COMA SCALE      Pain Descriptors  Discomfort 05/17/18 2328   Best Eye Response  4-->(E4) spontaneous 05/18/18 0046    HEIGHT AND WEIGHT     Best Motor Response  6-->(M6) obeys commands 05/18/18 0046    Height  1.702 m (5' 7\") 05/17/18 2132   Best Verbal Response  5-->(V5) oriented 05/18/18 0046    Height Method  Stated 05/17/18 2132   Dejuan Coma Scale Score  15 05/18/18 0046            Patient Lines/Drains/Airways Status    Active LINES/DRAINS/AIRWAYS     None            Patient Lines/Drains/Airways Status    Active PICC/CVC     None            Intake/Output Detail Report     Date Intake     Output Net    Shift P.O. I.V. IV Piggyback Total Urine Total       Noc 05/17/18 2300 - 05/18/18 0659 -- -- -- -- -- -- 0    Day 05/18/18 0700 - 05/18/18 1459 -- -- -- -- -- -- 0    Liz 05/18/18 1500 - 05/18/18 2259 420 -- -- 420 -- -- 420    Noc 05/18/18 2300 - 05/19/18 0659 -- -- -- -- 225 225 -225    Day 05/19/18 0700 - 05/19/18 1459 -- -- -- -- -- -- 0      Case Management/Discharge Planning     Case Management/Discharge Planning Flowsheet     REFERRAL INFORMATION     Concerns Comments  patient usually independent at home.  Does not receive any services at this time 03/17/12 1132    Arrived From  home;emergency " department 03/17/12 1132   DISCHARGE PLANNING      LIVING ENVIRONMENT     Transportation Available  car;family or friend will provide 05/18/18 1339    Lives With  spouse 05/18/18 1339   ABUSE RISK SCREEN      Living Arrangements  house 05/18/18 1339   QUESTION TO PATIENT:  Has a member of your family or a partner(now or in the past) intimidated, hurt, manipulated, or controlled you in any way?  no 05/17/18 1712    COPING/STRESS     QUESTION TO PATIENT: Do you feel safe going back to the place where you are living?  yes 05/17/18 1712    Major Change/Loss/Stressor  none 03/17/12 0716   OTHER      ASSESSMENT/CONCERNS TO BE ADDRESSED     Are you depressed or being treated for depression?  No 05/17/18 2329    Concerns To Be Addressed  discharge planning concerns 03/17/12 1132

## 2018-05-17 NOTE — IP AVS SNAPSHOT
MRN:[id number]                      After Visit Summary   5/17/2018    Tristen Canela    MRN: [id number]           Thank you!     Thank you for choosing [facility] for your care. Our goal is always to provide you with excellent care. Hearing back from our patients is one way we can continue to improve our services. Please take a few minutes to complete the written survey that you may receive in the mail after you visit with us. Thank you!        Patient Information     Date Of Birth          1/20/1925        About your hospital stay     You were admitted on:  May 17, 2018 You last received care in the:  [address] [facility]    You were discharged on:  May 19, 2018       Who to Call     For medical emergencies, please call 911.  For non-urgent questions about your medical care, please call your primary care provider or clinic, [phone]          Attending Provider     Provider Specialty    David Evans MD Emergency Medicine    Eusebio Melgar MD Internal Medicine    [facility], David Talbot MD Internal Medicine       Primary Care Provider Office Phone # Fax #    Robbjohana Franck Watson [phone]      After Care Instructions     Activity - Up with nursing assistance       Apply left knee immobilizer when up, may weight bear as tolerated            Advance Diet as Tolerated       Follow this diet upon discharge: Regular            Fall precautions           General info for SNF       Length of Stay Estimate: Short Term Care: Estimated # of Days <30  Condition at Discharge: Stable  Level of care:skilled   Rehabilitation Potential: Good  Admission H&P remains valid and up-to-date: Yes  Recent Chemotherapy: N/A  Use Nursing Home Standing Orders: Yes            Mantoux instructions       Give two-step Mantoux (PPD) Per Facility Policy Yes                  Follow-up Appointments     Follow Up and recommended labs and tests       Follow up with Dr. Grove of  "Orthopedics within one week, follow up with NH provider                  Your next 10 appointments already scheduled     Jul 11, 2018  9:20 AM CDT   SHORT with Luis Miguel Watson MD   [facility] ([facility]    [address] [place] [id number] [phone]              Additional Services     Physical Therapy Adult Consult       Evaluate and treat as clinically indicated.    Reason:  Left knee strain                  Pending Results     No orders found from 5/15/2018 to 5/18/2018.            Statement of Approval     Ordered          05/19/18 1000  I have reviewed and agree with all the recommendations and orders detailed in this document.  EFFECTIVE NOW     Approved and electronically signed by:  Eusebio Melgar MD             Admission Information     Date & Time Provider Department Dept. Phone    5/17/2018 David Coelho MD [address] Medical Surgical [phone]      Your Vitals Were     Blood Pressure Pulse Temperature Respirations Height Weight    119/71 99 98.5  F (36.9  C) (Oral) 18 1.702 m (5' 7\") 66.5 kg (146 lb 9.7 oz)    Pulse Oximetry BMI (Body Mass Index)                93% 22.96 kg/m2          MyChart Information     [employer] gives you secure access to your electronic health record. If you see a primary care provider, you can also send messages to your care team and make appointments. If you have questions, please call your primary care clinic.  If you do not have a primary care provider, please call [phone] and they will assist you.        Care EveryWhere ID     This is your Care EveryWhere ID. This could be used by other organizations to access your [facility] medical records  [id number]        Equal Access to Services     KANIKA MAYS : Dominic Fajardo, warickieda luqraymundo, qaybta lloyd farnsworth. So [facility] [phone].    ATENCIÓN: Si habla español, tiene a frank disposición " servicios gratuitos de asistencia lingüística. Simeon valdez [phone].    We comply with applicable federal civil rights laws and [place] laws. We do not discriminate on the basis of race, color, national origin, age, disability, sex, sexual orientation, or gender identity.               Review of your medicines      START taking        Dose / Directions    acetaminophen 325 MG tablet   Commonly known as:  TYLENOL        Dose:  650 mg   Take 2 tablets (650 mg) by mouth every 4 hours as needed for mild pain   Quantity:  100 tablet   Refills:  0         CONTINUE these medicines which may have CHANGED, or have new prescriptions. If we are uncertain of the size of tablets/capsules you have at home, strength may be listed as something that might have changed.        Dose / Directions    lisinopril 5 MG tablet   Commonly known as:  PRINIVIL/ZESTRIL   This may have changed:  See the new instructions.   Used for:  Benign essential hypertension        TAKE ONE-HALF TABLET BY MOUTH ONCE DAILY   Quantity:  45 tablet   Refills:  3         CONTINUE these medicines which have NOT CHANGED        Dose / Directions    aspirin 81 MG EC tablet   Used for:  Coronary artery disease involving native coronary artery of native heart without angina pectoris        Dose:  81 mg   Take 1 tablet (81 mg) by mouth daily   Quantity:  90 tablet   Refills:  1       ferrous gluconate 324 (38 Fe) MG tablet   Commonly known as:  FERGON   Used for:  Low iron        Dose:  324 mg   Take 1 tablet (324 mg) by mouth every other day   Quantity:  100 tablet   Refills:  0       LUPRON 5 MG/ML Soln   Used for:  Prostate cancer (H)   Generic drug:  Leuprolide Acetate        1 SHOT PER DR LARA EVERY FOUR MONTHS FOR PROSTATE CANCER   Quantity:  1 mL   Refills:  0       metoprolol tartrate 25 MG tablet   Commonly known as:  LOPRESSOR   Used for:  Congestive heart failure, unspecified congestive heart failure chronicity, unspecified congestive heart failure  type (H)        Dose:  12.5 mg   Take 0.5 tablets (12.5 mg) by mouth 2 times daily   Quantity:  90 tablet   Refills:  3         STOP taking     Multi-vitamin Tabs tablet   Generic drug:  multivitamin, therapeutic with minerals                Where to get your medicines      Some of these will need a paper prescription and others can be bought over the counter. Ask your nurse if you have questions.     You don't need a prescription for these medications     acetaminophen 325 MG tablet    aspirin 81 MG EC tablet    ferrous gluconate 324 (38 Fe) MG tablet    lisinopril 5 MG tablet    metoprolol tartrate 25 MG tablet                Protect others around you: Learn how to safely use, store and throw away your medicines at www.disposemymeds.org.             Medication List: This is a list of all your medications and when to take them. Check marks below indicate your daily home schedule. Keep this list as a reference.      Medications           Morning Afternoon Evening Bedtime As Needed    acetaminophen 325 MG tablet   Commonly known as:  TYLENOL   Take 2 tablets (650 mg) by mouth every 4 hours as needed for mild pain   Last time this was given:  650 mg on 5/18/2018  5:58 PM                                aspirin 81 MG EC tablet   Take 1 tablet (81 mg) by mouth daily   Last time this was given:  81 mg on 5/19/2018 10:01 AM                                ferrous gluconate 324 (38 Fe) MG tablet   Commonly known as:  FERGON   Take 1 tablet (324 mg) by mouth every other day                                lisinopril 5 MG tablet   Commonly known as:  PRINIVIL/ZESTRIL   TAKE ONE-HALF TABLET BY MOUTH ONCE DAILY   Last time this was given:  2.5 mg on 5/19/2018 10:01 AM                                LUPRON 5 MG/ML Soln   1 SHOT PER DR LARA EVERY FOUR MONTHS FOR PROSTATE CANCER   Generic drug:  Leuprolide Acetate                                metoprolol tartrate 25 MG tablet   Commonly known as:  LOPRESSOR   Take 0.5 tablets  (12.5 mg) by mouth 2 times daily   Last time this was given:  12.5 mg on 5/19/2018 10:02 AM

## 2018-05-18 NOTE — PLAN OF CARE
Problem: Patient Care Overview  Goal: Plan of Care/Patient Progress Review  Discharge Planner PT   Patient plan for discharge: Pt will need short term rehab at TCU due to his difficulty with sit to stand at home and now not able to use the left well at all.     Current status: He needed max assist from 2 people with sit to stand plus with transfer bed to .     Barriers to return to prior living situation: He was getting quite a bit of help from his wife at home. He needed help with sit to stand to the walker. Then once to the walker he could walk to his lift chair or to the bathroom. He then would need his wife to get to standing again. He also would use his walker to go on the porch and to the stairs. 3 steps that he would use the railing and descend or ascend per self but his wife would bring the walker to the level he was going too. He would get on the riding  and go wherever he wanted he stated.He is not able to return to this activity due to the left LE injury. HIs UE function and strength is very limited. He would injure them further if tried non weight bearing walking. The ortho Dr will determine what weight bearing level he can use. This eval was with NWB on left.    Recommendations for discharge: TCU for rehab to regain transfer ability and walking ability    Rationale for recommendations: He can not go home. His wife can not handle him this way because he can 't help.          Entered by: Mary Moe 05/18/2018 1:09 PM
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
## 2018-05-18 NOTE — DISCHARGE SUMMARY
[facility]    Discharge Summary  Hospitalist    Date of Admission:  5/17/2018  Date of Discharge:  5/18/2018  Discharging Provider: David Coelho MD  Date of Service (when I saw the patient): 05/18/18    Discharge Diagnoses   Active Problems:    Knee injury, left, initial encounter    Coronary artery disease involving native coronary artery of native heart without angina pectoris    Chronic combined systolic and diastolic congestive heart failure (H)    Mitral valve insufficiency, unspecified etiology    Benign essential hypertension    Left knee pain    Prostate cancer (H)    Anemia in chronic kidney disease    CKD (chronic kidney disease) stage 3, GFR 30-59 ml/min       History of Present Illness   Copied from H&P:   Tristen Canela is a 89 year old male who presents with left knee pain. He was mowing a ditch with his garden tractor when the tractor started tipping.  He was thrown off the  and the  righted itself.  He doesn't recall how he landed but he thinks the  tire may have run over his leg but he is not sure.  He had significant pain in his left knee and not able to bear weight.  He flagged down his mail person who along with another person were able to get him back onto the tractor and he drove home.  Once home however he was not able to bear weight so was then brought to the ED.        Hospital Course   Tristen Canela was admitted on 5/17/2018.  The following problems were addressed during his hospitalization:    Active Problems:    Knee injury, left, initial encounter    Assessment: xrays did not reveal any acute fractures or dislocations.  Patient however unable to bear weight.  Pain controlled with tylenol.      Plan: discharge to rehab later today after being seen by PT and ortho and can f/u with GNP or PMD within one week      Coronary artery disease involving native coronary artery of native heart without angina pectoris     Assessment: chronic and stable    Plan: continue home meds      Chronic combined systolic and diastolic congestive heart failure (H)    Assessment: no signs of acute CHF    Plan: continue lisinopril      Mitral valve insufficiency, unspecified etiology    Assessment: no signs of acute CHF    Plan: continue lisinopril      Benign essential hypertension    Assessment: controlled    Plan: no change in meds        Prostate cancer (H)    Assessment: noted past history    Plan: routine f/u with urology      Anemia in chronic kidney disease    Assessment: chronic and stable    Plan: routine outpatient recheck      CKD (chronic kidney disease) stage 3, GFR 30-59 ml/min    Assessment: chronic    Plan: routine outpatient recheck      # Discharge Pain Plan:   - During his hospitalization, Tristen experienced pain due to left knee pain.  The pain plan for discharge was discussed with Tristen and the plan was created in a collaborative fashion.    - Pharmacologic adjuvants:  Acetaminophen      David Coelho MD    Significant Results and Procedures   No procedures performed during this admission    Pending Results   These results will be followed up by   Unresulted Labs Ordered in the Past 30 Days of this Admission     No orders found for last 61 day(s).          Code Status   DNR / DNI       Primary Care Physician   Luis Miguel Watson    Physical Exam   Temp: 98.3  F (36.8  C) Temp src: Oral BP: 128/68 Pulse: 87   Resp: 18 SpO2: 93 % O2 Device: None (Room air)    Vitals:    05/17/18 1715 05/17/18 2129   Weight: 68.9 kg (152 lb) 66.5 kg (146 lb 9.7 oz)     Vital Signs with Ranges  Temp:  [98  F (36.7  C)-98.3  F (36.8  C)] 98.3  F (36.8  C)  Pulse:  [] 87  Resp:  [14-18] 18  BP: (105-138)/(58-75) 128/68  SpO2:  [93 %-95 %] 93 %       Lungs:  CTA throughout  CV:  RRR with 3/6 systolic  murmur  Abdomen: +BS, Soft, NT  Still with left knee effusion      Discharge Disposition   Discharged to rehabilitation  facility  Condition at discharge: Stable    Consultations This Hospital Stay   SOCIAL WORK IP CONSULT  PHYSICAL THERAPY ADULT IP CONSULT  ORTHOPEDIC SURGERY IP CONSULT  ORTHOPEDIC SURGERY IP CONSULT  PHYSICAL THERAPY ADULT IP CONSULT    Time Spent on this Encounter   I, David Coelho MD, personally saw the patient today and spent less than or equal to 30 minutes discharging this patient.    Discharge Orders     General info for SNF   Length of Stay Estimate: Short Term Care: Estimated # of Days <30  Condition at Discharge: Stable  Level of care:skilled   Rehabilitation Potential: Good  Admission H&P remains valid and up-to-date: Yes  Recent Chemotherapy: N/A  Use Nursing Home Standing Orders: Yes     Mantoux instructions   Give two-step Mantoux (PPD) Per Facility Policy Yes     Activity - Up with nursing assistance     Follow Up and recommended labs and tests   Follow up with GNP or PMD within one week     DNR/DNI     Physical Therapy Adult Consult   Evaluate and treat as clinically indicated.    Reason:  Left knee strain     Fall precautions     Advance Diet as Tolerated   Follow this diet upon discharge: Regular       Discharge Medications   Current Discharge Medication List      START taking these medications    Details   acetaminophen (TYLENOL) 325 MG tablet Take 2 tablets (650 mg) by mouth every 4 hours as needed for mild pain  Qty: 100 tablet    Associated Diagnoses: Knee injury, left, initial encounter         CONTINUE these medications which have CHANGED    Details   aspirin 81 MG EC tablet Take 1 tablet (81 mg) by mouth daily  Qty: 90 tablet, Refills: 1    Associated Diagnoses: Coronary artery disease involving native coronary artery of native heart without angina pectoris      ferrous gluconate (FERGON) 324 (38 Fe) MG tablet Take 1 tablet (324 mg) by mouth every other day  Qty: 100 tablet, Refills: 0    Associated Diagnoses: Low iron      lisinopril (PRINIVIL/ZESTRIL) 5 MG tablet TAKE ONE-HALF  TABLET BY MOUTH ONCE DAILY  Qty: 45 tablet, Refills: 3    Associated Diagnoses: Benign essential hypertension      metoprolol tartrate (LOPRESSOR) 25 MG tablet Take 0.5 tablets (12.5 mg) by mouth 2 times daily  Qty: 90 tablet, Refills: 3    Associated Diagnoses: Congestive heart failure, unspecified congestive heart failure chronicity, unspecified congestive heart failure type (H)         CONTINUE these medications which have NOT CHANGED    Details   LUPRON 5 MG/ML SC SOLN 1 SHOT PER DR LARA EVERY FOUR MONTHS FOR PROSTATE CANCER  Qty: 1 mL, Refills: 0    Associated Diagnoses: Prostate cancer (H)         STOP taking these medications       Multiple Vitamin (MULTI-VITAMIN) per tablet Comments:   Reason for Stopping:             Allergies   Allergies   Allergen Reactions     No Known Drug Allergies      Data

## 2018-05-18 NOTE — PROGRESS NOTES
visited with patient and family. Discussed recommendation for TCU placement.  Discussed medicare guidelines for SNF and gave a Medicare certified list of are facilities.  Referrals sent to [facility], [facility], and [facility].  Both [facility] and [facility] are full and have no beds.  Discussed up front private pay costs for the TCU/SNF.  Family ok with this.      Patient has been accepted at first choice, [facility].  They can accommodate patient on Saturday or Sunday.  They do request that he arrive by noon.  Discussed transportation options with family.  Transport undecided at this time.     Care Transitions to continue to follow.

## 2018-05-18 NOTE — ED NOTES
Pt resting on cart with multiple family members at bedside.  Pt denies any changes at this time.  Pt updated on status.

## 2018-05-18 NOTE — PROVIDER NOTIFICATION
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     R-(recommendations): MD notified via pink slip    New orders from Dr. Melgar. Intermittent cath for bladder scan > 500ml

## 2018-05-18 NOTE — PROGRESS NOTES
S-(situation): urinary retention    B-(background): fall at home    A-(assessment): Bladder scanned for 675, pt reports no urge to void or pain. Attempted to use urinal with no success. Brief was wet. Attempted to straight cath per orders, unable to retract foreskin. 2 RN's attempted with no success. Attempted to cath without foreskin retracted with no success.      R-(recommendations): MD notified, awaiting new orders.     New orders- start weighing briefs, pt to follow up with urology as an out patient if no other issues.

## 2018-05-18 NOTE — ED NOTES
ED Nursing criteria listed below was addressed during verbal handoff:     Abnormal vitals: No  Abnormal results: No  Med Reconciliation completed: Yes  Meds given in ED: No  Any Overdue Meds: No  Core Measures: No  Isolation: No  Special needs: Yes, decreased mobility  Skin assessment: Yes, no open sores    Observation Patient  Education provided: Yes

## 2018-05-18 NOTE — CONSULTS
IMP:  Left knee osteoarthritis-possible extensor mechanism disruption    Plan:  Unable to perform straight leg raise or actively extend lower leg, recommend MRI of left knee to rule out extensor mechanism disruption  Keep nwb   Elevate and ice.  Further plan pending MRI.  D/w Dr anderson.  Full consult:[id number]    Wilmer Grove D.O.

## 2018-05-18 NOTE — PROGRESS NOTES
"SPIRITUAL HEALTH SERVICES  SPIRITUAL ASSESSMENT Progress Note  [facility]      During Rounding,  introduced himself to Tristen \"Ravi\" Rola, his wife, & his daughter and informed them of his availability.    Ryan Camarena M.Div., [facility]  Staff   Office tel: [phone]    "

## 2018-05-18 NOTE — PROGRESS NOTES
S-(situation): Patient registered to Observation. Patient arrived to room 249 via cart from ED    B-(background): left knee pain    A-(assessment): Temp: 98.2  F (36.8  C) Temp src: Oral BP: 129/75 Pulse: 98   Resp: 16 SpO2: 95 % O2 Device: None (Room air)   Pt c/o left knee pain.  Tolerated transfer from cart to bed with staff assist.    R-(recommendations): Orders and observation goals reviewed with patient and family    Nursing Observation criteria listed below was met:    Skin issues/needs documented:Yes, scattered bruising.  Abrasion left knee  Isolation needs addressed, if appropriate: NA  Fall Prevention: Education given and documented: Yes  Education Assessment documented:Yes  Education Documented (Pre-existing chronic infection such as, MRSA/VRE need education on admission): Yes  Medication Reconciliation Complete: Yes  New medication patient education completed and documented (Possible Side Effects of Common Medications handout): Yes  Home medications if not able to send immediately home with family stored here: NA  Reminder note placed in discharge instructions: NA  Patient has discharge needs (If yes, please explain): Yes, pending PT consult

## 2018-05-18 NOTE — H&P
[facility]    History and Physical  Hospitalist       Date of Admission:  5/17/2018  Date of Service (when I saw the patient): 05/17/18    Assessment & Plan       Active Problems:    Knee injury, left, initial encounter    Assessment: xray without any identifiable fractures.  He has an associated effusion and he may have a bad sprain.  He is unable to bear weight and not able to return home directly.    Plan: register to observation, ortho consult, PT and SS consult, tylenol for pain for now      Coronary artery disease involving native coronary artery of native heart without angina pectoris    Assessment: chronic and stable    Plan: continue home meds      Chronic combined systolic and diastolic congestive heart failure (H)    Assessment: controlled    Plan: continue home meds      Mitral valve insufficiency, unspecified etiology    Assessment: no signs of acute CHF    Plan: continue ACEI      Benign essential hypertension    Assessment: controlled    Plan: continue home meds        Prostate cancer (H)    Assessment: noted past history and treated with Lupron    Plan: outpatient f/u      Anemia in chronic kidney disease    Assessment: chronic and stable with labs not repeated today    Plan: outpatient f/u      CKD (chronic kidney disease) stage 3, GFR 30-59 ml/min    Assessment: chronic    Plan: routine outpatient f/u      # Pain Assessment:  Current Pain Score 5/17/2018   Pain score (0-10) 3   - Tristen is experiencing pain due to left knee pain. Pain management was discussed and the plan was created in a collaborative fashion.  Tristen's response to the current recommendations: engaged  - Pharmacologic adjuvants: Acetaminophen          DVT Prophylaxis: anticipate less than 24 hour stay  Code Status: DNR / DNI    Disposition: Expected discharge in 1 days once plan in place for safe disposition.    David Coelho MD    Primary Care Physician   Luis Miguel Watson    Chief Complaint    Left knee pain    History is obtained from the patient    History of Present Illness   Tristen Canela is a 89 year old male who presents with left knee pain. He was mowing a ditch with his garden tractor when the tractor started tipping.  He was thrown off the  and the  righted itself.  He doesn't recall how he landed but he thinks the  tire may have run over his leg but he is not sure.  He had significant pain in his left knee and not able to bear weight.  He flagged down his mail person who along with another person were able to get him back onto the tractor and he drove home.  Once home however he was not able to bear weight so was then brought to the ED.     Past Medical History    I have reviewed this patient's medical history and updated it with pertinent information if needed.   Past Medical History:   Diagnosis Date     Basal cell carcinoma 2012    right nose lesion     Prostate cancer (H) 2010    Geoffrey 6 (3+3)       Past Surgical History   I have reviewed this patient's surgical history and updated it with pertinent information if needed.  Past Surgical History:   Procedure Laterality Date     MOHS MICROGRAPHIC PROCEDURE  12    right nose lesion     PHACOEMULSIFICATION WITH STANDARD INTRAOCULAR LENS IMPLANT  2013    Procedure: PHACOEMULSIFICATION WITH STANDARD INTRAOCULAR LENS IMPLANT;  PHACOEMULSIFICATION WITH STANDARD INTRAOCULAR LENS IMPLANT RIGHT EYE;  Surgeon: Collin Booth MD;  Location: [facility] OR     TURP  08/26/10       Prior to Admission Medications   Prior to Admission Medications   Prescriptions Last Dose Informant Patient Reported? Taking?   LUPRON 5 MG/ML SC SOLN More than a month at Unknown time  No No   Si SHOT PER DR LARA EVERY FOUR MONTHS FOR PROSTATE CANCER   Multiple Vitamin (MULTI-VITAMIN) per tablet 2018 at 1900  Yes Yes   Sig: Take 1 tablet by mouth daily.   aspirin EC 81 MG EC tablet 2018 at 0700  Yes Yes    Sig: Take 81 mg by mouth daily   ferrous gluconate (FERGON) 324 (38 FE) MG tablet 5/16/2018 at 1900  No Yes   Sig: Take 1 tablet (324 mg) by mouth every other day   lisinopril (PRINIVIL/ZESTRIL) 5 MG tablet 5/17/2018 at 0700  No Yes   Sig: TAKE ONE-HALF TABLET BY MOUTH ONCE DAILY   metoprolol (LOPRESSOR) 25 MG tablet 5/17/2018 at 0700  No Yes   Sig: Take 0.5 tablets (12.5 mg) by mouth 2 times daily      Facility-Administered Medications: None     Allergies   Allergies   Allergen Reactions     No Known Drug Allergies        Social History   I have reviewed this patient's social history and updated it with pertinent information if needed. Tristen HANSEN Rola  reports that he has never smoked. He has never used smokeless tobacco. He reports that he does not drink alcohol or use illicit drugs.    Family History   I have reviewed this patient's family history and updated it with pertinent information if needed.   Family History   Problem Relation Age of Onset     OSTEOPOROSIS Mother      Asthma Maternal Grandmother      HEART DISEASE Maternal Grandmother      CHF     Prostate Cancer Brother      CANCER Sister      ovarian     CANCER Sister      derm     OSTEOPOROSIS Sister      OSTEOPOROSIS Sister        Review of Systems   The 10 point Review of Systems is negative other than noted in the HPI or here.     Physical Exam   Temp: 98  F (36.7  C) Temp src: Oral BP: 138/74 Pulse: 92   Resp: 16 SpO2: 95 %      Vital Signs with Ranges  Temp:  [98  F (36.7  C)] 98  F (36.7  C)  Pulse:  [] 92  Resp:  [14-16] 16  BP: (119-138)/(67-74) 138/74  SpO2:  [95 %] 95 %  152 lbs 0 oz    Constitutional:   awake, alert, cooperative, no apparent distress, and appears stated age     Eyes:   Lids and lashes normal, pupils equal, round and reactive to light, extra ocular muscles intact, sclera clear, conjunctiva normal     ENT:   Normocephalic, without obvious abnormality, atraumatic, sinuses nontender on palpation, external ears without  lesions, oral pharynx with moist mucous membranes, tonsils without erythema or exudates, gums normal and good dentition.     Neck:   Supple, symmetrical, trachea midline, no adenopathy, thyroid symmetric, not enlarged and no tenderness, skin normal     Hematologic / Lymphatic:   no cervical lymphadenopathy and no supraclavicular lymphadenopathy     Back:   Symmetric, no curvature, spinous processes are non-tender on palpation, paraspinous muscles are non-tender on palpation, no costal vertebral tenderness     Lungs:   No increased work of breathing, good air exchange, clear to auscultation bilaterally, no crackles or wheezing     Cardiovascular:   Normal apical impulse, regular rate and rhythm, normal S1 and S2, no S3 or S4, and 3/6 systolic murmur noted     Abdomen:   normal bowel sounds, soft, non-distended, non-tender, no masses palpated, no hepatosplenomegally     Musculoskeletal:   Moderate effusion of the left knee.  Abrasion noted over the patella.  No focal tenderness     Neurologic:   Awake, alert, oriented to name, place and time.  Cranial nerves II-XII are grossly intact.  Motor is 5 out of 5 bilaterally.   Sensory is intact.        Data   Data reviewed today:  I personally reviewed no images or EKG's today.  No lab results found in last 7 days.    Recent Results (from the past 24 hour(s))   XR Knee Left 3 Views    Narrative    KNEE LEFT THREE VIEWS    5/17/2018 5:56 PM     HISTORY: Knee pain after a fall.     COMPARISON: None.      Impression    IMPRESSION: Advanced medial and lateral compartment degenerative  changes. Moderate patellofemoral degenerative changes. Large joint  effusion. No evidence of acute fracture or subluxation. Vascular  calcifications typical of a diabetic patient.    JOSE R KUMAR MD

## 2018-05-18 NOTE — PLAN OF CARE
Problem: Patient Care Overview  Goal: Plan of Care/Patient Progress Review  Outcome: No Change  VSS. Afebrile. Pt does complain of some L knee. Declines pain medication at this time. Denies any numbness/tingling. Able to move all extremities. No complaints. At 0600 NA notified writer that pt had vomited at some point since last check. Pt had dark brown/coffee ground liquid emesis.  Pt denies any further nausea. MD notified.

## 2018-05-18 NOTE — CONSULTS
"Consult Date:  05/18/2018      DATE OF CONSULTATION:  05/18/2018      REQUESTING PHYSICIAN:  David Evans MD      REASON FOR CONSULTATION:  Knee pain.        HISTORY OF PRESENT ILLNESS:  This is a 89-year-old male who was mowing the lawn in a ditch.  He was \"dumped off\" the lawnmower landing on his left side.  The lawnmower did not run over him or hit him.  However, he had significant left knee pain and was unable to stand up.  Subsequently evaluated in the ER.  He had radiographs, which ruled out fracture.  He is currently evaluated on the second floor.  Still having an inability to bear weight.  He reports no preexisting knee issues.  Wife is at the bedside.  Otherwise, denies injuries.      PAST MEDICAL HISTORY:  Includes coronary artery disease, mitral valve insufficiency, congestive heart failure, hypertension, prostate cancer, chronic kidney disease, basal cell carcinoma.      PAST SURGICAL HISTORY:  Includes cataracts, TURP.      FAMILY HISTORY:  Includes a sister with ovarian cancer.  Grandmother with heart disease.      SOCIAL HISTORY:  No tobacco, EtOH or illicits.  Lives with his wife.      MEDICATIONS:  Include aspirin, iron, lisinopril, Lupron, metoprolol.      REVIEW OF SYSTEMS: A 10-point review of systems was performed and otherwise unremarkable, as per HPI.      PHYSICAL EXAMINATION:   GENERAL:  He is awake, alert, in no acute distress.  He is nontoxic appearing.  He is conversant.  He is pleasant.   VITAL SIGNS:  Temperature is 97.6, 98 pulse, 150/90 blood pressure, 18 respiratory rate with a 99% room air sat.     EXTREMITIES:  Focused exam of the left lower extremity showed the overlying skin to be intact with no excoriation, erythema or breakdown.  He has a large knee effusion with tenderness globally around the knee including along the patella, particularly the quad tendon area.  He has no specific distal pain.  He has no peripheral edema.  Distal neurovascular is intact.  He has no gross " collateral instability, although he is unable to perform a straight leg.  Unable to perform any active leg extension.  Passively, he tolerates motion from approximately 7-40 degrees.  There is no evidence of infection.  The compartments are soft and compressible.      IMAGING:  Radiographs reviewed of the left knee, nonweightbearing, show extensive degenerative changes with bone-on-bone arthritis throughout.  There are no apparent fractures or dislocations.      IMPRESSION:  This is a 89-year-old male who has underlying left knee arthritis with possible extensor mechanism disruption.      PLAN:  I discussed it with him and his wife.  I have recommended an MRI to rule out extensor mechanism disruption, particularly his quad tendon.  Plan to keep him nonweightbearing in the meantime, elevate and ice.  Hopefully, he will obtain the MRI today.  I did review the case with Dr. Melgar.         DOROTHY BLUE DO             D: 2018   T: 2018   MT: BRIDGET      Name:     DELVIN MEAD   MRN:      [id number]        Account:       [id number]   :      1925           Consult Date:  2018      Document: [id number]

## 2018-05-19 PROBLEM — S72.436A: Status: ACTIVE | Noted: 2018-01-01

## 2018-05-19 PROBLEM — R33.9 URINARY RETENTION: Status: ACTIVE | Noted: 2018-01-01

## 2018-05-19 PROBLEM — S83.512A RUPTURE OF ANTERIOR CRUCIATE LIGAMENT OF LEFT KNEE: Status: ACTIVE | Noted: 2018-01-01

## 2018-05-19 NOTE — PROGRESS NOTES
Feeling better, wife/family at bedside. Obtained MRI last evening-radiology reading pending.    Exam:  AxO 4 in nad  Left knee moderate effusion, able to flex knee better today. Tenderness better along supra-patellar area.  Dnvi.    MRI: arthritis throughout. Macerated tears of meniscus. Extensor mechanism intact    PLAN:  Stable for discharge  Knee immobilizer when up  wbat   Follow up with me in 1 week.  Ice   Elevate.    Wilmer Grove    Addendum:  Official read possible acute vs subacute non displaced medial femoral condyle fracture. With indistinct margins will treat as contusion.   Family and updated. wbat but make sure to advance activity only as pain improves.   Continue current plan.

## 2018-05-19 NOTE — PROGRESS NOTES
Name: Tristen Canela    MRN#: [id number]    Reason for Hospitalization: Knee injury, left, initial encounter [S89.92XA]    Discharge Date: 5/19/18    Patient / Family response to discharge plan: Patient and family in agreement with d/c to [facility] today. [facility] transport 1045.     Follow-Up Appt: Future Appointments  Date Time Provider Department Center   7/11/2018 9:20 AM Luis Miguel Watson MD [facility]       Other Providers (Care Coordinator, County Services, PCA services etc): No    Discharge Disposition: transitional care unit    AISLINN HILTON    PAS-RR    Per [facility] regulation, CTS team completed and submitted PAS-RR to [place] [employer] Direct [employer] via the [employer]. CTS team advised SNF and they are aware a PAS-RR has been submitted.     CTS team reviewed with pt or health care agent that they may be contacted for a follow up appointment within 10 days of hospital discharge if SNF stay is <30 days. Contact information for [employer] was also provided.     Pt or health care agent verbalized understanding.     PAS-RR # [id number]

## 2018-05-19 NOTE — PROGRESS NOTES
S-(situation): Patient discharged to [facility] via handivan with knee immobilizer in place    B-(background): fall at home    A-(assessment): Pt is alert and oriented to person, place and situation. Vitals stable, afebrile. No complaints of pain or nausea. CMS intact. Left knee is swollen. Scattered bruises and scabs. Coccyx has blanchable redness.      Last bowel movement: prior to admission.      R-(recommendations):Report called to [facility]. Listed belongings gathered and sent with patient.     Discharge Nursing Criteria:     Care Plan and Patient education resolved: Yes    Core Measures   Core Measures applicable during stay (Stroke/TIA, MI, Pneumonia and SSI): na    Vaccines  Influenza status verified at discharge:  Yes        [facility]  [facility] and hospital aquired medications returned to patient: NA  Medication Bin checked and emptied on discharge Yes  All paperwork sent with patient/Copy of AVS given to patient or family Yes.  Paperwork faxed to [facility]

## 2018-05-19 NOTE — PLAN OF CARE
Problem: Patient Care Overview  Goal: Plan of Care/Patient Progress Review  Outcome: Therapy, progress towards functional goals is fair  Vital signs stable, 95% on RA, hypertensive at times-161/64.  Afebrile.    A&O to place and situation,  disoriented to date.  Pt denying pain/ discomfort.  Pt due to void most of shift, dry brief until 0530, Pt had incontinent urine of 200ml, void of 25ml and a post void- bladder scan amt 429ml.  Lung sounds clear.  Bowel sounds normoactive.  Non weight bearing at this time and repositioning x2 staff members.  Coccyx does have blanchable redness, educated Pt on importance of changing position frequently, Pt very cooperative with repo every 2hrs.  Pt also has scabs/abrasions on head and legs, bruising on upper extremities.  Left knee is swollen and tender to touch, Pt denying analgesics, ok with ice through the night.  At 0530, Pt found to have spilled ensure, grape juice and grapes all over self, bed and floor.  Pt appears clear/alert but unsure on how to get a hold of us this am following spill.  Pt cleaned up/bed bath/ repositioned and bladder scanned for 429ml.

## 2018-05-19 NOTE — DISCHARGE SUMMARY
[facility] Observation Discharge Summary    Tristen Canela MRN# [id number]   Age: 89 year old YOB: 1925     Date of Observation:  5/17/2018  Date of Discharge:  5/19/2018   Initial Physician:  David Coelho MD  Discharge Physician:  Eusebio Melgar MD     Home clinic: [facility]  Primary care provider: Luis Miguel Watson          Admission Diagnoses:   Knee injury, left, initial encounter [S89.92XA]          Discharge Diagnoses:   Principal diagnosis: Nondisplaced fracture of medial condyle of femur (H)    Secondary diagnoses:    Nondisplaced fracture of medial condyle of femur (H)    Rupture of anterior cruciate ligament of left knee    Urinary retention    Coronary artery disease involving native coronary artery of native heart without angina pectoris    Prostate cancer (H)    Anemia in chronic kidney disease    CKD (chronic kidney disease) stage 3, GFR 30-59 ml/min    Chronic combined systolic and diastolic congestive heart failure (H)    Mitral valve insufficiency, unspecified etiology    Benign essential hypertension    * No resolved hospital problems. *            Procedures:   No procedures performed during this hospital stay           Discharge Medications:     Outpatient Prescriptions Marked as Taking for the 5/17/18 encounter (Hospital Encounter)   Medication Sig     acetaminophen (TYLENOL) 325 MG tablet Take 2 tablets (650 mg) by mouth every 4 hours as needed for mild pain     aspirin 81 MG EC tablet Take 1 tablet (81 mg) by mouth daily     ferrous gluconate (FERGON) 324 (38 Fe) MG tablet Take 1 tablet (324 mg) by mouth every other day     lisinopril (PRINIVIL/ZESTRIL) 5 MG tablet TAKE ONE-HALF TABLET BY MOUTH ONCE DAILY     metoprolol tartrate (LOPRESSOR) 25 MG tablet Take 0.5 tablets (12.5 mg) by mouth 2 times daily       Current Discharge Medication List      START taking these medications    Details   acetaminophen (TYLENOL) 325 MG tablet Take 2  tablets (650 mg) by mouth every 4 hours as needed for mild pain  Qty: 100 tablet    Associated Diagnoses: Knee injury, left, initial encounter         CONTINUE these medications which have CHANGED    Details   aspirin 81 MG EC tablet Take 1 tablet (81 mg) by mouth daily  Qty: 90 tablet, Refills: 1    Associated Diagnoses: Coronary artery disease involving native coronary artery of native heart without angina pectoris      ferrous gluconate (FERGON) 324 (38 Fe) MG tablet Take 1 tablet (324 mg) by mouth every other day  Qty: 100 tablet, Refills: 0    Associated Diagnoses: Low iron      lisinopril (PRINIVIL/ZESTRIL) 5 MG tablet TAKE ONE-HALF TABLET BY MOUTH ONCE DAILY  Qty: 45 tablet, Refills: 3    Associated Diagnoses: Benign essential hypertension      metoprolol tartrate (LOPRESSOR) 25 MG tablet Take 0.5 tablets (12.5 mg) by mouth 2 times daily  Qty: 90 tablet, Refills: 3    Associated Diagnoses: Congestive heart failure, unspecified congestive heart failure chronicity, unspecified congestive heart failure type (H)         CONTINUE these medications which have NOT CHANGED    Details   LUPRON 5 MG/ML SC SOLN 1 SHOT PER DR LARA EVERY FOUR MONTHS FOR PROSTATE CANCER  Qty: 1 mL, Refills: 0    Associated Diagnoses: Prostate cancer (H)         STOP taking these medications       Multiple Vitamin (MULTI-VITAMIN) per tablet Comments:   Reason for Stopping:                     Consultations:     Orthopedic surgery          Brief History of Illness:   89 year old male patient presented with 1 days history of worsening left knee pain after he had fallen off of a lawn tractor.  Due to concern for severe left knee pain with inability to bear weight and ambulate, hospitalization for observation was advised. See ER note and history and physical for details.          Hospital Course:   Patient was observed in the hospital.  X-rays of the left knee did not demonstrate any signs of fracture.  Orthopedic surgery was consulted and  recommended MRI of the knee.  MRI of the left knee demonstrated multiple abnormalities including arthritic changes, nondisplaced fracture of the medial femoral condyle, and complete tear of the anterior cruciate ligament.  Orthopedic surgery recommended use of a knee immobilizer with weightbearing as tolerated.  Patient was evaluated by physical therapy and was requiring much assistance for transfers and standing, so discharge to a TCU for anticipated short-term rehabilitation was recommended.  Patient initially had episodes of vomiting with dark colored material that was Gastroccult positive.  Hemoglobin was stable throughout his hospital stay and he was hemodynamically stable.  He did not have nausea and subsequently tolerated adequate oral intake without recurrent vomiting.  He also had urinary retention 500 mL postvoid residual on bladder scan although was not symptomatic and did void spontaneously intermittently.  No specific treatment for urinary retention was necessary during this hospital stay.  Outpatient follow-up with urology was recommended.    I evaluated this patient on the day of discharge.  Vital signs were stable.  He appeared comfortable while resting in bed.  Left knee was obviously swollen and asymmetric as compared with the right but was not erythematous.    Data   Most Recent 3 CBC's:  Recent Labs   Lab Test  05/18/18   2146  05/18/18   1402  05/18/18   0651  03/26/18   1029   07/12/17   1035   WBC   --    --   13.0*  8.6   --   8.3   HGB  10.7*  10.3*  10.4*  10.0*   < >  11.2*   MCV   --    --   93  98   --   99   PLT   --    --   294  299   --   245    < > = values in this interval not displayed.      Most Recent 3 BMP's:  Recent Labs   Lab Test  05/18/18   0651  03/20/18   1122  07/12/17   1035   NA  139  139  140   POTASSIUM  4.0  4.9  4.7   CHLORIDE  105  103  105   CO2  26  27  28   BUN  35*  38*  35*   CR  1.81*  1.71*  1.54*   ANIONGAP  8  9  7   ESTEPHANIE  8.5  8.7  8.8   GLC  150*  95  94      Results for orders placed or performed during the hospital encounter of 05/17/18   XR Knee Left 3 Views    Narrative    KNEE LEFT THREE VIEWS    5/17/2018 5:56 PM     HISTORY: Knee pain after a fall.     COMPARISON: None.      Impression    IMPRESSION: Advanced medial and lateral compartment degenerative  changes. Moderate patellofemoral degenerative changes. Large joint  effusion. No evidence of acute fracture or subluxation. Vascular  calcifications typical of a diabetic patient.    JOSE R KUMAR MD   MR Knee Left w/o Contrast    Narrative    MR LEFT KNEE WITHOUT CONTRAST   5/18/2018  7:47 PM    HISTORY: Left knee pain since 5/17/2018 following an injury. Evaluate  for quadriceps tendon rupture.    COMPARISON: Radiographs on 5/17/2018.    TECHNIQUE: Multiplanar MR imaging was performed without contrast.    FINDINGS:     Medial Meniscus: There is extensive maceration of the majority of the  meniscus.    Lateral Meniscus: There is moderate maceration of the meniscus.    Anterior Cruciate Ligament: Completely torn.    Posterior Cruciate Ligament: Unremarkable.    Medial Collateral Ligament: Unremarkable.    Lateral Collateral Ligament Complex, Popliteus Tendon: The iliotibial  band, fibular collateral ligament, biceps femoris tendon, and  popliteus tendon are unremarkable.    Osseous and Cartilaginous Structures: There is a nondisplaced fracture  along the periphery of the medial femoral condyle. This measures  approximately 4.0 cm craniocaudal x 5.0 cm AP x 0.8 cm transverse.  There is mild adjacent marrow edema. However, the margins of the  fracture line are somewhat smooth and this may not be acute. No other  fracture or osseous lesion is demonstrated. There is advanced  degenerative change with mixed grade 3 and grade IV chondromalacia  throughout the medial and lateral compartments with grade II and grade  III chondromalacia throughout the patellofemoral joint.    Extensor Mechanism: The quadriceps and  patellar tendons are  unremarkable. The medial and lateral patellar retinacula appear  unremarkable.    Joint Space: There is a very large joint effusion. No definite loose  bodies appreciated.    Additional Findings: There is a few cm wide Baker's cyst. No  semimembranosus-tibial collateral ligament or pes anserine bursitis.  No adjacent soft tissue pathology is seen.      Impression    IMPRESSION:   1. Complete tear of the anterior cruciate ligament.  2. Nondisplaced fracture along the periphery of the medial femoral  condyle. Although there is mild marrow edema, the fracture line is  somewhat smooth and this might not be acute. Clinical correlation is  recommended as this could be subacute in age.  3. No quadriceps tendon tear is seen.  4. Extensive degenerative changes of the medial and lateral  compartments including maceration of the menisci.    LAN MIRZA MD               Discharge Instructions and Follow-Up:   Discharge diet: Regular   Discharge activity:  Use knee immobilizer on left knee when up, up with assistance, may weight-bear as tolerated   Discharge follow-up: Follow up with Dr. Grove in 1 week  Follow-up with nursing home provider      Pending test results: none         Discharge Disposition:   Discharged to nursing home      Attestation:  I have reviewed today's vital signs, notes, medications, and test results.    Eusebio Melgar MD

## 2018-05-19 NOTE — CONSULTS
CARE TRANSITION SOCIAL WORK INITIAL ASSESSMENT:  Reason For Consult: discharge planning   Met with: Patient and Family.    DATA  Principal Problem:    Nondisplaced fracture of medial condyle of femur (H)  Active Problems:    Coronary artery disease involving native coronary artery of native heart without angina pectoris    Chronic combined systolic and diastolic congestive heart failure (H)    Mitral valve insufficiency, unspecified etiology    Benign essential hypertension    Rupture of anterior cruciate ligament of left knee    Urinary retention    Prostate cancer (H)    Anemia in chronic kidney disease    CKD (chronic kidney disease) stage 3, GFR 30-59 ml/min       Primary Care Clinic Name: [facility]   Primary Care MD Name: Dr. Watson   Contact information and PCP information verified: Yes      ASSESSMENT  Cognitive Status: awake, alert and oriented.       Resources List: Skilled Nursing Facility     Lives With: spouse  Living Arrangements: house  Quality Of Family Relationships: supportive, helpful, involved  Description of Support System: Supportive, Involved   Who is your support system?: Wife, Children   Support Assessment: Adequate family and caregiver support, Adequate social supports, Lacks adequate physical care   Insurance Concerns: No Insurance issues identified        This writer met with patient, wife- Tara, and daughter Ana yesterday and today. Writer introduced self and role. Discussed discharge planning and medicare guidelines in regards to home care and SNF benefits.     Discussed recommendation for TCU and private pay cost for this.  Patient and family in agreement and patient accepted for TCU placement at [facility] for today.     Family requested van transport and are aware of the private upfront cost.  [facility] transport at 1045 today.       PLAN    D/c to the TCU at [facility] today.     Discharge Planner   Discharge Plans in progress: Completed   Barriers to discharge plan:  none   Follow up plan: None        Entered by: AISLINN HILTON 05/19/2018 11:17 AM     JACKSON Coronel

## 2018-05-19 NOTE — PROGRESS NOTES
"Tristen HANSEN Patterson  Gender: male  : 1925  [address]  [facility] [id number] [phone] (home)     Medical Record: [id number]  Pharmacy: [facility] - [place], [place] - [address]  Primary Care Provider: Luis Miguel Watson    Parent's names are: Data Unavailable (mother) and Data Unavailable (father).      [facility]  May 19, 2018     Discharge Phone Call:  Key Words/Key Times      Introduction - AIDET (Acknowledge, Introduce, Duration, Explanation)      Empathy-   We are calling to see how you are since your recent stay in the hospital?     Call back COMMENTS:       Clinical Questions -  (f/u appts, medication side effects/purpose, ability to care for self at home) \"For your safety, it is important to us that you understand the purpose and side effects of your medications, can you tell me what your new medications are?\"     Call back COMMENTS:       Staff Recognition -  We like to recognize staff and physicians who have done an excellent job.  Do you remember any people from your care team that you would like recognize?     Call back COMMENTS:       Very Good Care -  We want to provide very good care to all patients.  How was your care?     Call back COMMENTS:       Opportunities for Improvement -  Our goal is to be the best.  Do you have any suggestions for things that we could improve upon?     Call back COMMENTS:       Thank You           Patient discharged to [facility] with van transport      "

## 2018-05-20 NOTE — PROGRESS NOTES
Physical Therapy Discharge Summary    Reason for therapy discharge:    Discharged to transitional care facility.    Progress towards therapy goal(s). See goals on Care Plan in [facility] electronic health record for goal details.  Goals not met.  Barriers to achieving goals:   discharge from facility.    Therapy recommendation(s):    Continued therapy is recommended.  Rationale/Recommendations:  Continue PT for gait and independence so pt can return home.

## 2018-05-21 NOTE — PROGRESS NOTES
[facility]  PRIMARY CARE PROVIDER AND CLINIC:  Luis Miguel Watson [facility] [address] [facility]  / [place] [address]  Chief Complaint   Patient presents with     Hospital F/U     Clinic Care Coordination - Initial     [facility] Medical Record Number:  [id number]    HPI:    Tristen Canela is a 89 year old  (1/20/1925),admitted to the [facility]   from [facility].  Hospital stay 5/17/18 through 5/19/18.    presented with worsening left knee pain after he had fallen off of a lawn tractor 1 day ago.  X-rays Neg. Ortho surgery consulted and recommended MRI of the knee which demonstrated arthritic changes, nondisplaced fracture of the medial femoral condyle, and complete tear of the anterior cruciate ligament.  Orthopedic surgery recommended use of a knee immobilizer with weightbearing as tolerated.     #Patient initially had episodes of vomiting with dark colored material that was Gastroccult positive.  Hemoglobin was stable throughout his hospital stay and he was hemodynamically stable.  He did not have nausea and subsequently tolerated adequate oral intake without recurrent vomiting.   # He also had urinary retention 500 mL postvoid residual on bladder scan although was not symptomatic and did void spontaneously intermittently.  No specific treatment for urinary retention was necessary during this hospital stay.  Outpatient follow-up with urology was recommended.     Admitted to this facility for  rehab, medical management and nursing care.  HPI information obtained from: facility chart records, facility staff, patient report and [facility] chart review.  Current issues are:      Closed nondisplaced fracture of medial condyle of left femur with routine healing, subsequent encounter and Rupture of anterior cruciate ligament of left knee, subsequent encounter  PT working with pt, emobilizer on - PT/OT working to get more comfortable w/c foot/leg left.    Pt denies pain in knee    Urinary retention  Denies ur now -     History of hematemesis  Noted in hosp - on ASA and no PPI    Anemia  On Fe.     CKD (chronic kidney disease) stage 3, GFR 30-59 ml/min  Noted on aCE - GFR lowering.     HTN  On ace and BB.     CODE STATUS/ADVANCE DIRECTIVES DISCUSSION:   DNR / DNI  Patient's living condition: lives with spouse    ALLERGIES:No known drug allergies  PAST MEDICAL HISTORY:  has a past medical history of Basal cell carcinoma (06/2012) and Prostate cancer (H) (March 2010).  PAST SURGICAL HISTORY:  has a past surgical history that includes turp (08/26/10); Mohs micrographic procedure (06/25/12); and Phacoemulsification with standard intraocular lens implant (4/18/2013).  FAMILY HISTORY: family history includes Asthma in his maternal grandmother; CANCER in his sister and sister; HEART DISEASE in his maternal grandmother; OSTEOPOROSIS in his mother, sister, and sister; Prostate Cancer in his brother.  SOCIAL HISTORY:  reports that he has never smoked. He has never used smokeless tobacco. He reports that he does not drink alcohol or use illicit drugs.    Post Discharge Medication Reconciliation Status: discharge medications reconciled, continue medications without change.  Current Outpatient Prescriptions   Medication Sig Dispense Refill     acetaminophen (TYLENOL) 325 MG tablet Take 2 tablets (650 mg) by mouth every 4 hours as needed for mild pain 100 tablet      aspirin 81 MG EC tablet Take 1 tablet (81 mg) by mouth daily 90 tablet 1     ferrous gluconate (FERGON) 324 (38 Fe) MG tablet Take 1 tablet (324 mg) by mouth every other day 100 tablet 0     lisinopril (PRINIVIL/ZESTRIL) 5 MG tablet TAKE ONE-HALF TABLET BY MOUTH ONCE DAILY 45 tablet 3     LUPRON 5 MG/ML SC SOLN 1 SHOT PER DR LARA EVERY FOUR MONTHS FOR PROSTATE CANCER 1 mL 0     metoprolol tartrate (LOPRESSOR) 25 MG tablet Take 0.5 tablets (12.5 mg) by mouth 2 times daily 90 tablet 3       ROS:  10 point ROS of  systems including Constitutional, Eyes, Respiratory, Cardiovascular, Gastroenterology, Genitourinary, Integumentary, Muscularskeletal, Psychiatric were all negative except for pertinent positives noted in my HPI.    Exam:  BP 98/55  Pulse 90  Temp 99  F (37.2  C)  Resp 16  Wt 147 lb (66.7 kg)  BMI 23.02 kg/m2  GENERAL APPEARANCE:  Alert, in no distress  RESP:  respiratory effort and palpation of chest normal, auscultation of lungs clear , no respiratory distress  CV:  Palpation and auscultation of heart done , rate and rhythm reg, no murmur, +2 on L, +1 on R peripheral edema  ABDOMEN:  normal bowel sounds, soft, nontender, no hepatosplenomegaly or other masses  M/S:   Gait and station enlarged L knee - emobilizer on , Digits and nails normal  SKIN:  Inspection and Palpation of skin and subcutaneous tissue intact  NEURO: 2-12 in normal limits and at patient's baseline  PSYCH:  insight and judgement, memory good , affect and mood normal      Lab/Diagnostic data:     CBC RESULTS:   Recent Labs   Lab Test  05/18/18   2146  05/18/18   1402  05/18/18   0651  03/26/18   1029   WBC   --    --   13.0*  8.6   RBC   --    --   3.51*  3.33*   HGB  10.7*  10.3*  10.4*  10.0*   HCT   --    --   32.6*  32.5*   MCV   --    --   93  98   MCH   --    --   29.6  30.0   MCHC   --    --   31.9  30.8*   RDW   --    --   14.9  13.5   PLT   --    --   294  299       Last Basic Metabolic Panel:  Recent Labs   Lab Test  05/18/18   0651  03/20/18   1122   NA  139  139   POTASSIUM  4.0  4.9   CHLORIDE  105  103   ESTEPHANIE  8.5  8.7   CO2  26  27   BUN  35*  38*   CR  1.81*  1.71*   GLC  150*  95       ASSESSMENT/PLAN:  Closed nondisplaced fracture of medial condyle of left femur with routine healing, subsequent encounter and Rupture of anterior cruciate ligament of left knee, subsequent encounter  Cont with POC  Noted risk of ASA but needed given DVT risk with change in mobility - given 81 not enough to completely prevent, but not wanting to  go further given recent hx of hematemesis    Urinary retention  F/u PVRS- noted hx of prostate CA    History of hematemesis  Recheck HGB  Won't start PPI unless needed given s/e/risks of PPIs    CKD (chronic kidney disease) stage 3, GFR 30-59 ml/min  GFR Estimate   Date Value Ref Range Status   05/18/2018 35 (L) >60 mL/min/1.7m2 Final     Comment:     Non  GFR Calc   03/20/2018 38 (L) >60 mL/min/1.7m2 Final     Comment:     Non  GFR Calc   07/12/2017 42 (L) >60 mL/min/1.7m2 Final     Comment:     Non  GFR Calc     Noted decline - will recheck    Anemia in other chronic diseases classified elsewhere  Will recheck given recent + finding     Benign essential hypertension  Monitor - BP goals are  <140/90 mm Hg.This is higher than ACC and AHA recommendations due to risk for hypotension, risk of dizziness and falls and risk of tissue/cerebral hypoperfusion. Noted patients BP is lower than goal and will adjust plan of care by DC lisinopril.  BP Readings from Last 3 Encounters:   05/21/18 98/55   05/19/18 119/71   05/10/18 134/82       Orders:  HGB recheck on 5/24 Dx. Hx  Of hematemesis.   BMP recheck 5/24 given CKD  PVRS TID  DC lisinopril.       Total time spent with patient visit at the skilled nursing facility was 41 min including patient visit and review of past records. Greater than 50% of total time spent with counseling and coordinating care due to acute chages with hematemeis and on aSA, MDM given DVT risk    Electronically signed by:  DEMETRA Yates CNP

## 2018-05-21 NOTE — PROGRESS NOTES
Clinic Care Coordination Contact  Care Team Conversations-  IP Handoff    Reason for Hospitalization:  Knee injury, left, initial encounter [S89.92XA]  Admit Date/Time: 5/17/2018  5:19 PM  Discharge Date: 5/19/18      NORMA PINEDO spoke with Elodia  at [facility] today. Pt is there for TCU, short-term rehab stay with goal to return home. Elodia will notify NORMA CC when pt is discharged back to home.     Plan: NORMA PINEDO to do outreach with pt once at home.     JACKSON [facility] Care Coordinator  [facility]  5/21/2018 10:09 AM  [phone]

## 2018-05-21 NOTE — LETTER
5/21/2018        RE: Tristen Canela  [address]  [facility] [address]        [place] [facility]  PRIMARY CARE PROVIDER AND CLINIC:  Luis Miguel Watson[address] [facility]  / [place] [address]  Chief Complaint   Patient presents with     Hospital F/U     Clinic Care Coordination - Initial     [place] Medical Record Number:  [id number]    HPI:    Tristen Canela is a 89 year old  (1/20/1925),admitted to the [facility]   from [facility].  Hospital stay 5/17/18 through 5/19/18.    presented with worsening left knee pain after he had fallen off of a lawn tractor  1 day ago.  X-rays  Neg. Ortho surgery consulted and recommended MRI of the knee which demonstrated arthritic changes, nondisplaced fracture of the medial femoral condyle, and complete tear of the anterior cruciate ligament.  Orthopedic surgery recommended use of a knee immobilizer with weightbearing as tolerated.      #Patient initially had episodes of vomiting with dark colored material that was Gastroccult positive.  Hemoglobin was stable throughout his hospital stay and he was hemodynamically stable.  He did not have nausea and subsequently tolerated adequate oral intake without recurrent vomiting.   # He also had urinary retention 500 mL postvoid residual on bladder scan although was not symptomatic and did void spontaneously intermittently.  No specific treatment for urinary retention was necessary during this hospital stay.  Outpatient follow-up with urology was recommended.     Admitted to this facility for  rehab, medical management and nursing care.  HPI information obtained from: facility chart records, facility staff, patient report and [facility] chart review.  Current issues are:      Closed nondisplaced fracture of medial condyle of left femur with routine healing, subsequent encounter and Rupture of anterior cruciate ligament of left knee, subsequent  encounter  PT working with pt, emobilizer on - PT/OT working to get more comfortable w/c foot/leg left.   Pt denies pain in knee    Urinary retention  Denies ur now -     History of hematemesis  Noted in hosp - on ASA and no PPI    Anemia  On Fe.     CKD (chronic kidney disease) stage 3, GFR 30-59 ml/min  Noted on aCE - GFR lowering.     HTN  On ace and BB.     CODE STATUS/ADVANCE DIRECTIVES DISCUSSION:   DNR / DNI  Patient's living condition: lives with spouse    ALLERGIES:No known drug allergies  PAST MEDICAL HISTORY:  has a past medical history of Basal cell carcinoma (06/2012) and Prostate cancer (H) (March 2010).  PAST SURGICAL HISTORY:  has a past surgical history that includes turp (08/26/10); Mohs micrographic procedure (06/25/12); and Phacoemulsification with standard intraocular lens implant (4/18/2013).  FAMILY HISTORY: family history includes Asthma in his maternal grandmother; CANCER in his sister and sister; HEART DISEASE in his maternal grandmother; OSTEOPOROSIS in his mother, sister, and sister; Prostate Cancer in his brother.  SOCIAL HISTORY:  reports that he has never smoked. He has never used smokeless tobacco. He reports that he does not drink alcohol or use illicit drugs.    Post Discharge Medication Reconciliation Status: discharge medications reconciled, continue medications without change.  Current Outpatient Prescriptions   Medication Sig Dispense Refill     acetaminophen (TYLENOL) 325 MG tablet Take 2 tablets (650 mg) by mouth every 4 hours as needed for mild pain 100 tablet      aspirin 81 MG EC tablet Take 1 tablet (81 mg) by mouth daily 90 tablet 1     ferrous gluconate (FERGON) 324 (38 Fe) MG tablet Take 1 tablet (324 mg) by mouth every other day 100 tablet 0     lisinopril (PRINIVIL/ZESTRIL) 5 MG tablet TAKE ONE-HALF TABLET BY MOUTH ONCE DAILY 45 tablet 3     LUPRON 5 MG/ML SC SOLN 1 SHOT PER DR LARA EVERY FOUR MONTHS FOR PROSTATE CANCER 1 mL 0     metoprolol tartrate (LOPRESSOR) 25  MG tablet Take 0.5 tablets (12.5 mg) by mouth 2 times daily 90 tablet 3       ROS:  10 point ROS of systems including Constitutional, Eyes, Respiratory, Cardiovascular, Gastroenterology, Genitourinary, Integumentary, Muscularskeletal, Psychiatric were all negative except for pertinent positives noted in my HPI.    Exam:  BP 98/55  Pulse 90  Temp 99  F (37.2  C)  Resp 16  Wt 147 lb (66.7 kg)  BMI 23.02 kg/m2  GENERAL APPEARANCE:  Alert, in no distress  RESP:  respiratory effort and palpation of chest normal, auscultation of lungs clear , no respiratory distress  CV:  Palpation and auscultation of heart done , rate and rhythm reg, no murmur, +2 on L, +1 on R peripheral edema  ABDOMEN:  normal bowel sounds, soft, nontender, no hepatosplenomegaly or other masses  M/S:   Gait and station enlarged L knee - emobilizer on , Digits and nails normal  SKIN:  Inspection and Palpation of skin and subcutaneous tissue intact  NEURO: 2-12 in normal limits and at patient's baseline  PSYCH:  insight and judgement, memory good , affect and mood normal      Lab/Diagnostic data:     CBC RESULTS:   Recent Labs   Lab Test  05/18/18   2146  05/18/18   1402  05/18/18   0651  03/26/18   1029   WBC   --    --   13.0*  8.6   RBC   --    --   3.51*  3.33*   HGB  10.7*  10.3*  10.4*  10.0*   HCT   --    --   32.6*  32.5*   MCV   --    --   93  98   MCH   --    --   29.6  30.0   MCHC   --    --   31.9  30.8*   RDW   --    --   14.9  13.5   PLT   --    --   294  299       Last Basic Metabolic Panel:  Recent Labs   Lab Test  05/18/18   0651  03/20/18   1122   NA  139  139   POTASSIUM  4.0  4.9   CHLORIDE  105  103   ESTEPHANIE  8.5  8.7   CO2  26  27   BUN  35*  38*   CR  1.81*  1.71*   GLC  150*  95       ASSESSMENT/PLAN:  Closed nondisplaced fracture of medial condyle of left femur with routine healing, subsequent encounter and Rupture of anterior cruciate ligament of left knee, subsequent encounter  Cont with POC  Noted risk of ASA but needed  given DVT risk with change in mobility - given 81 not enough to completely prevent, but not wanting to go further given recent hx of hematemesis    Urinary retention  F/u PVRS- noted hx of prostate CA    History of hematemesis  Recheck HGB  Won't start PPI unless needed given s/e/risks of PPIs    CKD (chronic kidney disease) stage 3, GFR 30-59 ml/min  GFR Estimate   Date Value Ref Range Status   05/18/2018 35 (L) >60 mL/min/1.7m2 Final     Comment:     Non  GFR Calc   03/20/2018 38 (L) >60 mL/min/1.7m2 Final     Comment:     Non  GFR Calc   07/12/2017 42 (L) >60 mL/min/1.7m2 Final     Comment:     Non  GFR Calc     Noted decline - will recheck    Anemia in other chronic diseases classified elsewhere  Will recheck given recent + finding     Benign essential hypertension  Monitor - BP goals are  <140/90 mm Hg.This is higher than ACC and AHA recommendations due to risk for hypotension, risk of dizziness and falls and risk of tissue/cerebral hypoperfusion. Noted patients BP is lower than goal and will adjust plan of care by DC lisinopril.  BP Readings from Last 3 Encounters:   05/21/18 98/55   05/19/18 119/71   05/10/18 134/82       Orders:  HGB recheck on 5/24 Dx. Hx  Of hematemesis.   BMP recheck 5/24 given CKD  PVRS TID  DC lisinopril.       Total time spent with patient visit at the skilled nursing facility was 41 min including patient visit and review of past records. Greater than 50% of total time spent with counseling and coordinating care due to acute chages with hematemeis and on aSA, MDM given DVT risk    Electronically signed by:  DEMETRA Yates CNP                    Sincerely,        DEMETRA Yates CNP

## 2018-05-24 NOTE — LETTER
5/24/2018        RE: Tristen Canela  [address]  [facility] [address]        [place] [facility]    Chief Complaint   Patient presents with     Nursing Home Acute       HPI:    Tristen Canela is a 89 year old  (1/20/1925), who is being seen today for an episodic care visit at [facility].  HPI information obtained from: facility chart records, facility staff, patient report and [facility] chart review. Today's concern is:  Anemia acute blood loss - likely GI  Pt here rehabbing s/p fall and fx of L knee- medial femoral condyle and tear of anterior crucate ligament - using emobilizer and starting PT.   In hosp pt had epidoses of Vomiting gastroccult + but hgb stable t/o hosp stay and was Dc'd to TCU.   F/u HGB done today and sig lower  Hemoglobin   Date Value Ref Range Status   05/24/2018 7.2 (L) 13.3 - 17.7 g/dL Final   05/18/2018 10.7 (L) 13.3 - 17.7 g/dL Final     Pt has not had a blood transfusion that he knows of.     PMH/PSH reviewed in EPIC today    REVIEW OF SYSTEMS:  4 point ROS including Respiratory, CV, GI and , other than that noted in the HPI,  is negative    /64  Pulse 128  Temp 98.2  F (36.8  C)  Resp 18  Wt 146 lb (66.2 kg)  BMI 22.87 kg/m2  GENERAL APPEARANCE:  Alert, in no distress  resp LS clear t/o   HR Tachy - 106 - reg rate.   +2 edema on L, none on right    ASSESSMENT/PLAN:  Anemia due to blood loss, acute  Likely GI given hosp findings - will set up for transfusion - discussed with pt risks/benefits of transfusion.     Once stabilized, can discuss how aggressive he wants to be with w/u - likely start with Upper GI    Start treatment with PPI today.   Cont Fe       1.  Please call [facility] infusion to see if pt can get a blood transfusion today, tomorrow or Saturday for Hgb 7.2 from 10.7 on 5/18.  Once scheduled, organization transportation  2.  Type and cross  3.  Transfusion 2 units & RBC.  4.  Lasix 20 mg po x 1 dose in between units while in infusion  center -   5.  Start Protonix 40 mg po q day (tried bid, but insurance won't cover).  Dx: GI bleed  6.  Recheck HGB and WBC on 5/29.  Dx: anemia /leukocytosis      Electronically signed by:  DEMETRA Yates CNP      Sincerely,        DEMETRA Yates CNP

## 2018-05-24 NOTE — PROGRESS NOTES
[place] [facility]    Chief Complaint   Patient presents with     Nursing Home Acute       HPI:    Tristen Canela is a 89 year old  (1/20/1925), who is being seen today for an episodic care visit at [facility].  HPI information obtained from: facility chart records, facility staff, patient report and [facility] chart review. Today's concern is:  Anemia acute blood loss - likely GI  Pt here rehabbing s/p fall and fx of L knee- medial femoral condyle and tear of anterior crucate ligament - using emobilizer and starting PT.   In hosp pt had epidoses of Vomiting gastroccult + but hgb stable t/o hosp stay and was Dc'd to TCU.   F/u HGB done today and sig lower  Hemoglobin   Date Value Ref Range Status   05/24/2018 7.2 (L) 13.3 - 17.7 g/dL Final   05/18/2018 10.7 (L) 13.3 - 17.7 g/dL Final     Pt has not had a blood transfusion that he knows of.     PMH/PSH reviewed in EPIC today    REVIEW OF SYSTEMS:  4 point ROS including Respiratory, CV, GI and , other than that noted in the HPI,  is negative    /64  Pulse 128  Temp 98.2  F (36.8  C)  Resp 18  Wt 146 lb (66.2 kg)  BMI 22.87 kg/m2  GENERAL APPEARANCE:  Alert, in no distress  resp LS clear t/o   HR Tachy - 106 - reg rate.   +2 edema on L, none on right    ASSESSMENT/PLAN:  Anemia due to blood loss, acute  Likely GI given hosp findings - will set up for transfusion - discussed with pt risks/benefits of transfusion.     Once stabilized, can discuss how aggressive he wants to be with w/u - likely start with Upper GI    Start treatment with PPI today.   Cont Fe       1.  Please call [facility] infusion to see if pt can get a blood transfusion today, tomorrow or Saturday for Hgb 7.2 from 10.7 on 5/18.  Once scheduled, organization transportation  2.  Type and cross  3.  Transfusion 2 units & RBC.  4.  Lasix 20 mg po x 1 dose in between units while in infusion center -   5.  Start Protonix 40 mg po q day (tried bid, but insurance won't cover).  Dx: GI  bleed  6.  Recheck HGB and WBC on 5/29.  Dx: anemia /leukocytosis      Electronically signed by:  DEMETRA Yates CNP

## 2018-05-25 PROBLEM — K92.0 HEMATEMESIS: Status: ACTIVE | Noted: 2018-01-01

## 2018-05-25 NOTE — PROGRESS NOTES
Infusion Nursing Note:  Tristen Canela presents today for Blood transfusion.    Patient seen by provider today: No   present during visit today: Not Applicable.    Note: expiratory wheeze noted pre and post transfusion of 1st unit. Lungs clear after 2nd unit completed. Pt attempted to eat lunch but vomited clear fluid after first bite.  Attempted to eat a piece of toast shortly after with the same result.  Dr Watson notified.  Zofran 4 mg ODT ordered and given.  No further food offered while here.   Small black stool noted with voiding on BSC.  Pt required 2 to transfer with much difficulty    Intravenous Access:  Peripheral IV placed.    Treatment Conditions:  Lab Results   Component Value Date    HGB 7.2 05/24/2018     Lab Results   Component Value Date    WBC 14.6 05/24/2018      Lab Results   Component Value Date    ANEU 4.5 03/26/2018     Lab Results   Component Value Date     05/18/2018      Blood transfusion consent signed 5/24/2018.      Post Infusion Assessment:  Patient tolerated infusion without incident.  Site patent and intact, free from redness, edema or discomfort.  Access discontinued per protocol.    Discharge Plan:   Discharge review sent with Tommy hills to [facility].   Patient discharged in stable condition accompanied by: Tommy Hills and family.  Departure Mode: Wheelchair.    Elaina Taveras RN

## 2018-05-25 NOTE — MR AVS SNAPSHOT
After Visit Summary   5/25/2018    Tristen Canela    MRN: [id number]           Patient Information     Date Of Birth          1/20/1925        Visit Information        Provider Department      5/25/2018 9:30 AM [facility] INFUSION CHAIR 5 [facility]        Today's Diagnoses     Anemia in stage 3 chronic kidney disease    -  1       Follow-ups after your visit        Follow-up notes from your care team     Return if symptoms worsen or fail to improve.      Your next 10 appointments already scheduled     May 31, 2018  9:10 AM CDT   [facility]usebio [facility]    [address] [place] [id number] [phone]            Jul 11, 2018  9:20 AM CDT   SHORT with Luis Miguel Watson MD   [facility] ([address] [id number] [phone]              Who to contact     If you have questions or need follow up information about today's clinic visit or your schedule please contact [facility] directly at [phone].  Normal or non-critical lab and imaging results will be communicated to you by [employer], letter or phone within 4 business days after the clinic has received the results. If you do not hear from us within 7 days, please contact the clinic through [employer] or phone. If you have a critical or abnormal lab result, we will notify you by phone as soon as possible.  Submit refill requests through [employer] or call your pharmacy and they will forward the refill request to us. Please allow 3 business days for your refill to be completed.          Additional Information About Your Visit        MyChart Information     [employer] gives you secure access to your electronic health record. If you see a primary care provider, you can also send messages to your care team and make appointments. If you have questions, please  call your primary care clinic.  If you do not have a primary care provider, please call [phone] and they will assist you.        Care EveryWhere ID     This is your Care EveryWhere ID. This could be used by other organizations to access your [place] medical records  [id number]        Your Vitals Were     Pulse Temperature Respirations Pulse Oximetry          109 97.6  F (36.4  C) (Temporal) 28 28%         Blood Pressure from Last 3 Encounters:   05/25/18 97/63   05/24/18 118/64   05/21/18 98/55    Weight from Last 3 Encounters:   05/24/18 66.2 kg (146 lb)   05/21/18 66.7 kg (147 lb)   05/17/18 66.5 kg (146 lb 9.7 oz)              We Performed the Following     Blood component     Blood component     Red blood cell prepare order unit     Transfuse red blood cell unit     Transfuse red blood cell unit        Primary Care Provider Office Phone # Fax #    Luis Miguel Franck Watson [phone]       [address] [place] [address]        Equal Access to Services     [facility]: Hadii aad ku hadasho Soomaali, waaxda luqadaha, qaybta kaalmada adeegyada, waxay willardin haysocorro mendoza . So [facility] [phone].    ATENCIÓN: Si habla español, tiene a frank disposición servicios gratuitos de asistencia lingüística. Llame al [phone].    We comply with applicable federal civil rights laws and [place] laws. We do not discriminate on the basis of race, color, national origin, age, disability, sex, sexual orientation, or gender identity.            Thank you!     Thank you for choosing [facility]  for your care. Our goal is always to provide you with excellent care. Hearing back from our patients is one way we can continue to improve our services. Please take a few minutes to complete the written survey that you may receive in the mail after your visit with us. Thank you!             Your Updated Medication List - Protect others around you: Learn how to safely use,  store and throw away your medicines at www.disposemymeds.org.          This list is accurate as of 5/25/18  3:58 PM.  Always use your most recent med list.                   Brand Name Dispense Instructions for use Diagnosis    acetaminophen 325 MG tablet    TYLENOL    100 tablet    Take 2 tablets (650 mg) by mouth every 4 hours as needed for mild pain    Knee injury, left, initial encounter       aspirin 81 MG EC tablet     90 tablet    Take 1 tablet (81 mg) by mouth daily    Coronary artery disease involving native coronary artery of native heart without angina pectoris       ferrous gluconate 324 (38 Fe) MG tablet    FERGON    100 tablet    Take 1 tablet (324 mg) by mouth every other day    Low iron       LUPRON 5 MG/ML Soln   Generic drug:  Leuprolide Acetate     1 mL    1 SHOT PER DR LARA EVERY FOUR MONTHS FOR PROSTATE CANCER    Prostate cancer (H)       metoprolol tartrate 25 MG tablet    LOPRESSOR    90 tablet    Take 0.5 tablets (12.5 mg) by mouth 2 times daily    Congestive heart failure, unspecified congestive heart failure chronicity, unspecified congestive heart failure type (H)       PROTONIX PO      Take 40 mg by mouth daily

## 2018-05-29 NOTE — PROGRESS NOTES
[place] [facility]    Chief Complaint   Patient presents with     Nursing Home Acute       HPI:    Tristen Canela is a 89 year old  (1/20/1925), who is being seen today for an episodic care visit at [facility].  HPI information obtained from: facility chart records, facility staff, patient report and [facility] chart review. Today's concern is:  Anemia due to blood loss, acute and History of hematemesis  + in hosp but HGB stable - then came to TCU and HGB down to 7.6 - tx 2 u on 5/25 and recheck on 5/29 still low at 7.9.   PPI and Fe started.   Pt denies obvious signs of blood GI upper or lower.     Rupture of anterior cruciate ligament of left knee, subsequent encounter  Stable - (reason for hosp/tcustay) - pain controlled.     Coronary artery disease involving native coronary artery of native heart without angina pectoris  Remains on BB with low bp's - given anemia keeping bb on.       REVIEW OF SYSTEMS:  4 point ROS including Respiratory, CV, GI and , other than that noted in the HPI,  is negative    PMH/PSH reviewed in EPIC today    BP 96/54  Pulse 82  Temp 98.6  F (37  C)  Resp 16  Wt 146 lb 6.4 oz (66.4 kg)  BMI 22.93 kg/m2  GENERAL APPEARANCE:  Alert, in no distress  RESP:  respiratory effort and palpation of chest normal, auscultation of lungs clear , no respiratory distress  CV:  Palpation and auscultation of heart done , rate and rhythm reg rhythm rate 90's, no murmur, no peripheral edema  ABDOMEN:  normal bowel sounds, soft, nontender, no hepatosplenomegaly or other masses  M/S:   Gait and station SBA - WBAT on L, Digits and nails normal  SKIN:  Inspection and Palpation of skin and subcutaneous tissue intact  NEURO: 2-12 in normal limits and at patient's baseline  PSYCH:  insight and judgement, memory good , affect and mood normal    Hemoglobin   Date Value Ref Range Status   05/29/2018 7.9 (L) 13.3 - 17.7 g/dL Final   05/24/2018 7.2 (L) 13.3 - 17.7 g/dL Final   ]    Post 2 u  on 5/25    ASSESSMENT/PLAN:  Anemia due to blood loss, acute and History of hematemesis  Given very min improvement of HGB lab - suspect cont bleeding but stable - will rechekc HGB in 2 days and if lower - will need re-transfusion and upper EGD     Rupture of anterior cruciate ligament of left knee, subsequent encounter  Discussed scheduled Tylenol - he agrees to this as will help with pain. cont PT,     Coronary artery disease involving native coronary artery of native heart without angina pectoris  Cont BB to assist in rate control/  If symptomatic will DC - but right now stable with lower BP's and HR.     Noted pt not wanting to eat as much as before - will start NHS.        1.  Hgb recheck 5/31.  Dx: anemia  2.  Change Tylenol to 650 mg po TID and BID PRN.  Dx: pain  3.  Start house supplement 4 oz po QD and po QD PRN (not eating meal).    Electronically signed by:  DEMETRA Yates CNP

## 2018-05-29 NOTE — LETTER
5/29/2018        RE: Tristen Canela  [address]  [facility] [address]        [facility]    Chief Complaint   Patient presents with     Nursing Home Acute       HPI:    Tristen Canela is a 89 year old  (1/20/1925), who is being seen today for an episodic care visit at [facility].  HPI information obtained from: facility chart records, facility staff, patient report and [facility] chart review. Today's concern is:  Anemia due to blood loss, acute and History of hematemesis  + in hosp but HGB stable - then came to TCU and HGB down to 7.6 - tx 2 u on 5/25 and recheck on 5/29 still low at 7.9.   PPI and Fe started.   Pt denies obvious signs of blood GI upper or lower.     Rupture of anterior cruciate ligament of left knee, subsequent encounter  Stable - (reason for hosp/tcustay) - pain controlled.     Coronary artery disease involving native coronary artery of native heart without angina pectoris  Remains on BB with low bp's - given anemia keeping bb on.       REVIEW OF SYSTEMS:  4 point ROS including Respiratory, CV, GI and , other than that noted in the HPI,  is negative    PMH/PSH reviewed in EPIC today    BP 96/54  Pulse 82  Temp 98.6  F (37  C)  Resp 16  Wt 146 lb 6.4 oz (66.4 kg)  BMI 22.93 kg/m2  GENERAL APPEARANCE:  Alert, in no distress  RESP:  respiratory effort and palpation of chest normal, auscultation of lungs clear , no respiratory distress  CV:  Palpation and auscultation of heart done , rate and rhythm reg rhythm rate 90's, no murmur, no peripheral edema  ABDOMEN:  normal bowel sounds, soft, nontender, no hepatosplenomegaly or other masses  M/S:   Gait and station SBA - WBAT on L, Digits and nails normal  SKIN:  Inspection and Palpation of skin and subcutaneous tissue intact  NEURO: 2-12 in normal limits and at patient's baseline  PSYCH:  insight and judgement, memory good , affect and mood normal    Hemoglobin   Date Value Ref Range Status   05/29/2018  7.9 (L) 13.3 - 17.7 g/dL Final   05/24/2018 7.2 (L) 13.3 - 17.7 g/dL Final   ]    Post 2 u on 5/25    ASSESSMENT/PLAN:  Anemia due to blood loss, acute and History of hematemesis  Given very min improvement of HGB lab - suspect cont bleeding but stable - will rechekc HGB in 2 days and if lower - will need re-transfusion and upper EGD     Rupture of anterior cruciate ligament of left knee, subsequent encounter  Discussed scheduled Tylenol - he agrees to this as will help with pain. cont PT,     Coronary artery disease involving native coronary artery of native heart without angina pectoris  Cont BB to assist in rate control/  If symptomatic will DC - but right now stable with lower BP's and HR.     Noted pt not wanting to eat as much as before - will start NHS.        1.  Hgb recheck 5/31.  Dx: anemia  2.  Change Tylenol to 650 mg po TID and BID PRN.  Dx: pain  3.  Start house supplement 4 oz po QD and po QD PRN (not eating meal).    Electronically signed by:  DEMETRA Yates CNP      Sincerely,        DEMETRA Yates CNP

## 2018-05-31 NOTE — LETTER
"    5/31/2018         RE: Tristen Canela  [address] [facility] [address]        Dear Colleague,    Thank you for referring your patient, Tristen Canela, to the [facility]. Please see a copy of my visit note below.    Office Visit-Follow up    Chief Complaint: Tristen Canela is a 89 year old male who is being seen for   Chief Complaint   Patient presents with     Knee Pain     left knee pain-      Consult     hospital follow up       History of Present Illness:   Today's visit:  Returns with his family for left knee pain.  Pain has been improving.  He has been able to stop on it for transfers.  February 18, 2018 Hospital consult  HISTORY OF PRESENT ILLNESS:  This is a 89-year-old male who was mowing the lawn in a ditch.  He was \"dumped off\" the lawnmower landing on his left side.  The lawnmower did not run over him or hit him.  However, he had significant left knee pain and was unable to stand up.  Subsequently evaluated in the ER.  He had radiographs, which ruled out fracture.  He is currently evaluated on the second floor.  Still having an inability to bear weight.  He reports no preexisting knee issues.  Wife is at the bedside.  Otherwise, denies injuries.         REVIEW OF SYSTEMS  General: negative for, night sweats, dizziness, fatigue  Resp: No shortness of breath and no cough  CV: negative for chest pain, syncope or near-syncope  GI: negative for nausea, vomiting and diarrhea  : negative for dysuria and hematuria  Musculoskeletal: as above  Neurologic: negative for syncope   Hematologic: negative for bleeding disorder    Physical Exam:  Vitals: /60 (BP Location: Right arm, Patient Position: Chair, Cuff Size: Adult Regular)  Temp 97.6  F (36.4  C) (Temporal)  Ht 5' 7\" (1.702 m)  Wt 147 lb (66.7 kg)  BMI 23.02 kg/m2  BMI= Body mass index is 23.02 kg/(m^2).  Constitutional: healthy, alert and no acute distress   Psychiatric: mentation appears normal and affect " normal/bright  NEURO: no focal deficits  RESP: Normal with easy respirations and no use of accessory muscles to breathe, no audible wheezing or retractions  CV: LLE:  no edema         SKIN: No erythema, rashes, excoriation, or breakdown. No evidence of infection.   JOINT/EXTREMITIES:left knee: Passively 5-90 .  Extensor mechanism intact.  Significantly improved knee effusion.  He has tenderness along the anterior medial knee joint.  He also has some generalized distal femur and proximal tibia pain.  Although minimal with aggressive palpation.  There is no gross instability with varus and valgus testing.  GAIT: not tested             Diagnostic Modalities:  Previous imaging reviewed.  Independent visualization of the images was performed.      Impression: left knee primary osteoarthritis with medial femoral condyle contusion versus nondisplaced fracture    Plan:  All of the above pertinent physical exam and imaging modalities findings was reviewed with Tristen and his family.    Overall improving.  Recommend discontinuing knee immobilizer.  Okay for weightbearing for transfers only.  Plan to see him back in 2 weeks for re-x-ray.  Okay to work on some range of motion with active and passive.      Return to clinic 2, week(s), or sooner as needed for changes.  Re-x-ray on return: Yes.  AP lateral nonweightbearing left knee    Wilmer Grove D.O.          Again, thank you for allowing me to participate in the care of your patient.        Sincerely,        Eusebio Grove, DO

## 2018-05-31 NOTE — PROGRESS NOTES
[facility]  [facility] [address]    PRE-OP EVALUATION:    [facility] Medical Record Number:  [id number]    Today's date: May 31, 2018    Tristen Canela (: 1925) presents for pre-operative evaluation assessment as requested by Dr. Keyes.  Pt requires evaluation and anesthesia risk assessment prior to undergoing surgery/procedure for treatment of urinary retention .  Proposed procedure: cysto, urethral dilation and catheter placement    Patient seen today at [facility]. HPI information obtained from: facility chart records, facility staff, patient report, [facility] chart review and Care Everywhere [facility] chart review.    Date of Surgery/ Procedure:   Time of Surgery/ Procedure: [facility]  Hospital/Surgical Facility: [facility]  Primary Physician: Luis Miguel Watson   Type of Anesthesia Anticipated: Choice  History of anesthesia complications: NONE  History of  abnormal bleeding: YES - recent hematemesis in hosp with acute HGB drop to 7.2 on  s/p transfusion 2 u at [facility]  History of blood transfusions: YES.  No complications.  Patient has a Health Care Directive or Living Will:  NO    PREOP QUESTIONNAIRE  ====================  1- NO - Do you ever have any pain or discomfort in your chest?  2- NO - Have you ever had a severe pain across the front of your chest lasting for half an hour or more?  3- NO - Do you have swelling in your feet or ankles at times?  4- NO - Are you troubled by shortness of breath when: walking on the level/ up a slight hill/ at night?  5- NO - Does your chest ever sound wheezy or whistling?  6- NO - Do you currently have a cold, bronchitis or other respiratory infection?  7- NO - Have you had a cold, bronchitis or other respiratory infection within the last 2 weeks?  8- NO - Do you usually have a cough?  9- NO - Do you sometimes get pains in the calves of your legs when you walk?  10-NO - Do you or anyone in your family have previous  history of blood clots?  11-NO - Do you or does anyone in your family have serious bleeding problem such as prolonged bleeding following surgeries or cuts?  12-NO - Have you ever had problems with anemia or been told to take iron pills?  13-YES - see above.  Have you had any abnormal blood loss such as black, tarry or bloody stools, or abnormal vaginal bleeding?  14-NO - Have you or any of your relatives ever had problems with anesthesia?  15-NO - Do you snore or stop breathing at night?  16-NO - Do you have any prosthetic heart valves or joints?  17-NO - Is there any chance that you may be pregnant?    HPI:   Pt had acute hosp stay 5/17 - 5/19 after fall, + nondisplaced fracture of medial femoral condyle and complete tear of anterior cruciate ligament - f/u ortho today and doing well WBAD and emobilizer DC'd  Below issues new/acute  in hosp:   a.  - new acute Gastroccult + emesis but stable HGB - HGB later dropped in TCU - tx with PPI and transfusion 2 u. HGB now trending up. Remains on ASA  B. Urinary retention - new  In hosp - PVRs at TCU ordered TID but only done once at 438 mls. Pt did not feel retention. F/u Urology today and (s/p turp) and urology unable to place 16 or 14 cath.      Patient Active Problem List    Diagnosis Date Noted     Coronary artery disease involving native coronary artery of native heart without angina pectoris 03/17/2012     Priority: High     Hematemesis - 10-->7 05/25/2018     Priority: Medium     Urinary retention 05/19/2018     Priority: Medium     Rupture of anterior cruciate ligament of left knee 05/17/2018     Priority: Medium     Nondisplaced fracture of medial condyle of femur (H) 05/17/2018     Priority: Medium     Mitral valve insufficiency, unspecified etiology 06/17/2016     Priority: Medium     Generalized muscle weakness 06/17/2016     Priority: Medium     Chronic combined systolic and diastolic congestive heart failure (H) 05/26/2016     Priority: Medium     Dry  bnp 2000       Benign essential hypertension 05/19/2016     Priority: Medium     Prostate cancer (H) 03/17/2012     Priority: Low     Anemia in chronic kidney disease 03/17/2012     Priority: Low     CKD (chronic kidney disease) stage 3, GFR 30-59 ml/min 03/17/2012     Priority: Low     Advance Care Planning 03/17/2012     Priority: Low     Advance Care Planning 3/29/2016: Receipt of ACP document:  Received: Health Care Directive which was witnessed or notarized on 10-12-15.  Document previously scanned on 2-26-16.  Validation form completed and sent to be scanned.  Code Status reflects choices in most recent ACP document and per documentation in ED Encounter by Dr Roldan wishes DNR/DNI dated 3-17-12.  Recommend goals of care discussion with patient/decision makers and POLST/ updated Code status order as applicable to reflect patient choices. .  Confirmed/documented designated decision maker(s).  Added by Annabelle Mackenzie RN Advance Care Planning Liaison with [employer]               Past Medical History:   Diagnosis Date     Basal cell carcinoma 06/2012    right nose lesion     Prostate cancer (H) March 2010    Geoffrey 6 (3+3)       Past Surgical History:   Procedure Laterality Date     MOHS MICROGRAPHIC PROCEDURE  06/25/12    right nose lesion     PHACOEMULSIFICATION WITH STANDARD INTRAOCULAR LENS IMPLANT  4/18/2013    Procedure: PHACOEMULSIFICATION WITH STANDARD INTRAOCULAR LENS IMPLANT;  PHACOEMULSIFICATION WITH STANDARD INTRAOCULAR LENS IMPLANT RIGHT EYE;  Surgeon: Collin Booth MD;  Location: [facility] OR     TURP  08/26/10       Family History   Problem Relation Age of Onset     OSTEOPOROSIS Mother      Asthma Maternal Grandmother      HEART DISEASE Maternal Grandmother      CHF     Prostate Cancer Brother      CANCER Sister      ovarian     CANCER Sister      derm     OSTEOPOROSIS Sister      OSTEOPOROSIS Sister        Social History   Substance Use Topics     Smoking status: Never Smoker      "Smokeless tobacco: Never Used     Alcohol use No     Allergies   Allergen Reactions     No Known Drug Allergies      Current Outpatient Prescriptions   Medication Sig Dispense Refill     Acetaminophen (TYLENOL PO) Take 650 mg by mouth 2 times daily as needed for mild pain or fever (also TID scheduled)       aspirin 81 MG EC tablet Take 1 tablet (81 mg) by mouth daily 90 tablet 1     ferrous gluconate (FERGON) 324 (38 Fe) MG tablet Take 1 tablet (324 mg) by mouth every other day 100 tablet 0     metoprolol tartrate (LOPRESSOR) 25 MG tablet Take 0.5 tablets (12.5 mg) by mouth 2 times daily 90 tablet 3     NUTRITIONAL SUPPLEMENT LIQD Take 4 oz by mouth daily Also QD PRN       Pantoprazole Sodium (PROTONIX PO) Take 40 mg by mouth daily         REVIEW OF SYSTEMS:  10 point ROS of systems including Constitutional, Eyes, Respiratory, Cardiovascular, Gastroenterology, Genitourinary, Integumentary, Muscularskeletal, Psychiatric were all negative except for pertinent positives noted in my HPI.    EXAM:  /62  Pulse 78  Temp 98.4  F (36.9  C)  Resp 16  Ht 5' 7\" (1.702 m)  Wt 148 lb 11.2 oz (67.4 kg)  BMI 23.29 kg/m2  GENERAL APPEARANCE:  Alert, in no distress  RESP:  respiratory effort and palpation of chest normal, auscultation of lungs clear , no respiratory distress  CV:  Palpation and auscultation of heart done , rate and rhythm reg, 3/6 murmur, no peripheral edema  ABDOMEN:  normal bowel sounds, soft, nontender, no hepatosplenomegaly or other masses  M/S:   Gait and station SBA, Digits and nails normal  SKIN:  Inspection and Palpation of skin and subcutaneous tissue intact  NEURO: 2-12 in normal limits and at patient's baseline  PSYCH:  insight and judgement, memory good , affect and mood normal      DIAGNOSTICS:   Preop Testing   EKG: Not indicated due to non-vascular surgery and low risk of event (age <65 and without cardiac risk factors)  Hemoglobin (indicated for history of anemia or procedure with " significant blood loss such as tonsillectomy, major intraperitoneal surgery, vascular surgery, major spine surgery, total joint replacement)  Serum Potassium  Serum Creatinine    Creatinine   Date Value Ref Range Status   05/18/2018 1.81 (H) 0.66 - 1.25 mg/dL Final     Potassium   Date Value Ref Range Status   05/18/2018 4.0 3.4 - 5.3 mmol/L Final       IMPRESSION:  Reason for surgery/procedure: urinary retention  Diagnosis/reason for consult: urinary retention    The proposed surgical procedure is considered LOW risk.    For above listed surgery and anesthesia:   Patient is at MODERATE risk for surgery/procedure and perioperative/procedure complications.    RECOMMENDATIONS:    --Approval given to proceed with proposed procedure, without further diagnostic evaluation  I do have HGB ordered for 6/5.     Signed Electronically by: DEMETRA Yates CNP

## 2018-05-31 NOTE — MR AVS SNAPSHOT
After Visit Summary   5/31/2018    Tristen Canela    MRN: [id number]           Patient Information     Date Of Birth          1/20/1925        Visit Information        Provider Department      5/31/2018 9:10 AM Eusebio Grove,  [facility]        Today's Diagnoses     Primary osteoarthritis of left knee    -  1    Contusion of left knee, initial encounter           Follow-ups after your visit        Your next 10 appointments already scheduled     May 31, 2018  2:00 PM CDT   Nursing Home with DEMETRA [facility]   [facility]iatric[facility] Transitional Care ([facility])    [address] [place] [id number]   [phone]            Jun 14, 2018  9:00 AM CDT   Return Visit with Eusebio Grove DO   [facility]    [address] [place] [id number] [phone]            Jul 11, 2018  9:20 AM CDT   SHORT with Luis Miguel Watson MD   [facility] ([facility])    [address] [place] [id number] [phone]              Who to contact     If you have questions or need follow up information about today's clinic visit or your schedule please contact [facility] directly at [phone].  Normal or non-critical lab and imaging results will be communicated to you by MyChart, letter or phone within 4 business days after the clinic has received the results. If you do not hear from us within 7 days, please contact the clinic through MyChart or phone. If you have a critical or abnormal lab result, we will notify you by phone as soon as possible.  Submit refill requests through 8bit or call your pharmacy and they will forward the refill request to us. Please allow 3 business days for your refill to be completed.          Additional Information About Your Visit        youmagharKadient Information     8bit gives you secure access to your  "electronic health record. If you see a primary care provider, you can also send messages to your care team and make appointments. If you have questions, please call your primary care clinic.  If you do not have a primary care provider, please call [phone] and they will assist you.        Care EveryWhere ID     This is your Care EveryWhere ID. This could be used by other organizations to access your [facility] medical records  [id number]        Your Vitals Were     Temperature Height BMI (Body Mass Index)             97.6  F (36.4  C) (Temporal) 5' 7\" (1.702 m) 23.02 kg/m2          Blood Pressure from Last 3 Encounters:   05/31/18 100/60   05/29/18 96/54   05/25/18 97/63    Weight from Last 3 Encounters:   05/31/18 147 lb (66.7 kg)   05/29/18 146 lb 6.4 oz (66.4 kg)   05/24/18 146 lb (66.2 kg)              Today, you had the following     No orders found for display       Primary Care Provider Office Phone # Fax #    Robbjohana Franck Watson [phone] [address] [place] [address]        Equal Access to Services     ANG MAYS AH: Hadii tina allredo Socrystal, waaxda luqadaha, qaybta kaalmada adeegyada, lloyd corcoran. So [facility] [phone].    ATENCIÓN: Si habla español, tiene a frank disposición servicios gratuitos de asistencia lingüística. [facility] [phone].    We comply with applicable federal civil rights laws and [place] laws. We do not discriminate on the basis of race, color, national origin, age, disability, sex, sexual orientation, or gender identity.            Thank you!     Thank you for choosing [facility]  for your care. Our goal is always to provide you with excellent care. Hearing back from our patients is one way we can continue to improve our services. Please take a few minutes to complete the written survey that you may receive in the mail after your visit with us. Thank you!             Your Updated Medication List - " Protect others around you: Learn how to safely use, store and throw away your medicines at www.disposemymeds.org.          This list is accurate as of 5/31/18 12:37 PM.  Always use your most recent med list.                   Brand Name Dispense Instructions for use Diagnosis    aspirin 81 MG EC tablet     90 tablet    Take 1 tablet (81 mg) by mouth daily    Coronary artery disease involving native coronary artery of native heart without angina pectoris       ferrous gluconate 324 (38 Fe) MG tablet    FERGON    100 tablet    Take 1 tablet (324 mg) by mouth every other day    Low iron       metoprolol tartrate 25 MG tablet    LOPRESSOR    90 tablet    Take 0.5 tablets (12.5 mg) by mouth 2 times daily    Congestive heart failure, unspecified congestive heart failure chronicity, unspecified congestive heart failure type (H)       NUTRITIONAL SUPPLEMENT Liqd      Take 4 oz by mouth daily Also QD PRN        PROTONIX PO      Take 40 mg by mouth daily        TYLENOL PO      Take 650 mg by mouth 2 times daily as needed for mild pain or fever (also TID scheduled)

## 2018-05-31 NOTE — PROGRESS NOTES
"Office Visit-Follow up    Chief Complaint: Tristen Canela is a 89 year old male who is being seen for   Chief Complaint   Patient presents with     Knee Pain     left knee pain-      Consult     hospital follow up       History of Present Illness:   Today's visit:  Returns with his family for left knee pain.  Pain has been improving.  He has been able to stop on it for transfers.  February 18, 2018 Hospital consult  HISTORY OF PRESENT ILLNESS:  This is a 89-year-old male who was mowing the lawn in a ditch.  He was \"dumped off\" the lawnmower landing on his left side.  The lawnmower did not run over him or hit him.  However, he had significant left knee pain and was unable to stand up.  Subsequently evaluated in the ER.  He had radiographs, which ruled out fracture.  He is currently evaluated on the second floor.  Still having an inability to bear weight.  He reports no preexisting knee issues.  Wife is at the bedside.  Otherwise, denies injuries.         REVIEW OF SYSTEMS  General: negative for, night sweats, dizziness, fatigue  Resp: No shortness of breath and no cough  CV: negative for chest pain, syncope or near-syncope  GI: negative for nausea, vomiting and diarrhea  : negative for dysuria and hematuria  Musculoskeletal: as above  Neurologic: negative for syncope   Hematologic: negative for bleeding disorder    Physical Exam:  Vitals: /60 (BP Location: Right arm, Patient Position: Chair, Cuff Size: Adult Regular)  Temp 97.6  F (36.4  C) (Temporal)  Ht 5' 7\" (1.702 m)  Wt 147 lb (66.7 kg)  BMI 23.02 kg/m2  BMI= Body mass index is 23.02 kg/(m^2).  Constitutional: healthy, alert and no acute distress   Psychiatric: mentation appears normal and affect normal/bright  NEURO: no focal deficits  RESP: Normal with easy respirations and no use of accessory muscles to breathe, no audible wheezing or retractions  CV: LLE:  no edema         SKIN: No erythema, rashes, excoriation, or breakdown. No evidence of " infection.   JOINT/EXTREMITIES:left knee: Passively 5-90 .  Extensor mechanism intact.  Significantly improved knee effusion.  He has tenderness along the anterior medial knee joint.  He also has some generalized distal femur and proximal tibia pain.  Although minimal with aggressive palpation.  There is no gross instability with varus and valgus testing.  GAIT: not tested             Diagnostic Modalities:  Previous imaging reviewed.  Independent visualization of the images was performed.      Impression: left knee primary osteoarthritis with medial femoral condyle contusion versus nondisplaced fracture    Plan:  All of the above pertinent physical exam and imaging modalities findings was reviewed with Tristen and his family.    Overall improving.  Recommend discontinuing knee immobilizer.  Okay for weightbearing for transfers only.  Plan to see him back in 2 weeks for re-x-ray.  Okay to work on some range of motion with active and passive.      Return to clinic 2, week(s), or sooner as needed for changes.  Re-x-ray on return: Yes.  AP lateral nonweightbearing left knee    Wilmer Grove D.O.

## 2018-05-31 NOTE — LETTER
2018        RE: Tristen Canela  [address]  [facility] [address]        [facility]  [facility] [address]    PRE-OP EVALUATION:    [facility] Medical Record Number:  [id number]    Today's date: May 31, 2018    Tristen Canela (: 1925) presents for pre-operative evaluation assessment as requested by Dr. Keyes.  Pt requires evaluation and anesthesia risk assessment prior to undergoing surgery/procedure for treatment of urinary retention .  Proposed procedure: cysto, urethral dilation and catheter placement    Patient seen today at [facility] location. HPI information obtained from: facility chart records, facility staff, patient report, [facility] chart review and Care Everywhere [facility] chart review.    Date of Surgery/ Procedure:   Time of Surgery/ Procedure: [facility]  Hospital/Surgical Facility: [facility]  Primary Physician: Luis Miguel Watson   Type of Anesthesia Anticipated: Choice  History of anesthesia complications: NONE  History of  abnormal bleeding: YES - recent hematemesis in hosp with acute HGB drop to 7.2 on  s/p transfusion 2 u at [facility]  History of blood transfusions: YES.  No complications.  Patient has a Health Care Directive or Living Will:  NO    PREOP QUESTIONNAIRE  ====================  1- NO - Do you ever have any pain or discomfort in your chest?  2- NO - Have you ever had a severe pain across the front of your chest lasting for half an hour or more?  3- NO - Do you have swelling in your feet or ankles at times?  4- NO - Are you troubled by shortness of breath when: walking on the level/ up a slight hill/ at night?  5- NO - Does your chest ever sound wheezy or whistling?  6- NO - Do you currently have a cold, bronchitis or other respiratory infection?  7- NO - Have you had a cold, bronchitis or other respiratory infection within the last 2 weeks?  8- NO - Do you usually have a cough?  9- NO - Do you sometimes get pains in the  calves of your legs when you walk?  10-NO - Do you or anyone in your family have previous history of blood clots?  11-NO - Do you or does anyone in your family have serious bleeding problem such as prolonged bleeding following surgeries or cuts?  12-NO - Have you ever had problems with anemia or been told to take iron pills?  13-YES - see above.  Have you had any abnormal blood loss such as black, tarry or bloody stools, or abnormal vaginal bleeding?  14-NO - Have you or any of your relatives ever had problems with anesthesia?  15-NO - Do you snore or stop breathing at night?  16-NO - Do you have any prosthetic heart valves or joints?  17-NO - Is there any chance that you may be pregnant?    HPI:   Pt had acute hosp stay 5/17 - 5/19 after fall, + nondisplaced fracture of medial femoral condyle and complete tear of anterior cruciate ligament - f/u ortho today and doing well WBAD and emobilizer DC'd  Below issues new/acute  in hosp:   a.  - new acute Gastroccult + emesis but stable HGB - HGB later dropped in TCU - tx with PPI and transfusion 2 u. HGB now trending up. Remains on ASA  B. Urinary retention - new  In hosp - PVRs at TCU ordered TID but only done once at 438 mls. Pt did not feel retention. F/u Urology today and (s/p turp) and urology unable to place 16 or 14 cath.      Patient Active Problem List    Diagnosis Date Noted     Coronary artery disease involving native coronary artery of native heart without angina pectoris 03/17/2012     Priority: High     Hematemesis - 10-->7 05/25/2018     Priority: Medium     Urinary retention 05/19/2018     Priority: Medium     Rupture of anterior cruciate ligament of left knee 05/17/2018     Priority: Medium     Nondisplaced fracture of medial condyle of femur (H) 05/17/2018     Priority: Medium     Mitral valve insufficiency, unspecified etiology 06/17/2016     Priority: Medium     Generalized muscle weakness 06/17/2016     Priority: Medium     Chronic combined  systolic and diastolic congestive heart failure (H) 05/26/2016     Priority: Medium     Dry bnp 2000       Benign essential hypertension 05/19/2016     Priority: Medium     Prostate cancer (H) 03/17/2012     Priority: Low     Anemia in chronic kidney disease 03/17/2012     Priority: Low     CKD (chronic kidney disease) stage 3, GFR 30-59 ml/min 03/17/2012     Priority: Low     Advance Care Planning 03/17/2012     Priority: Low     Advance Care Planning 3/29/2016: Receipt of ACP document:  Received: Health Care Directive which was witnessed or notarized on 10-12-15.  Document previously scanned on 2-26-16.  Validation form completed and sent to be scanned.  Code Status reflects choices in most recent ACP document and per documentation in ED Encounter by Dr Roldan wishes DNR/DNI dated 3-17-12.  Recommend goals of care discussion with patient/decision makers and POLST/ updated Code status order as applicable to reflect patient choices. .  Confirmed/documented designated decision maker(s).  Added by Annabelle Mackenzie RN Advance Care Planning Liaison with [employer]               Past Medical History:   Diagnosis Date     Basal cell carcinoma 06/2012    right nose lesion     Prostate cancer (H) March 2010    Geoffrey 6 (3+3)       Past Surgical History:   Procedure Laterality Date     MOHS MICROGRAPHIC PROCEDURE  06/25/12    right nose lesion     PHACOEMULSIFICATION WITH STANDARD INTRAOCULAR LENS IMPLANT  4/18/2013    Procedure: PHACOEMULSIFICATION WITH STANDARD INTRAOCULAR LENS IMPLANT;  PHACOEMULSIFICATION WITH STANDARD INTRAOCULAR LENS IMPLANT RIGHT EYE;  Surgeon: Collin Booth MD;  Location: [facility] OR     TURP  08/26/10       Family History   Problem Relation Age of Onset     OSTEOPOROSIS Mother      Asthma Maternal Grandmother      HEART DISEASE Maternal Grandmother      CHF     Prostate Cancer Brother      CANCER Sister      ovarian     CANCER Sister      derm     OSTEOPOROSIS Sister      OSTEOPOROSIS  "Sister        Social History   Substance Use Topics     Smoking status: Never Smoker     Smokeless tobacco: Never Used     Alcohol use No     Allergies   Allergen Reactions     No Known Drug Allergies      Current Outpatient Prescriptions   Medication Sig Dispense Refill     Acetaminophen (TYLENOL PO) Take 650 mg by mouth 2 times daily as needed for mild pain or fever (also TID scheduled)       aspirin 81 MG EC tablet Take 1 tablet (81 mg) by mouth daily 90 tablet 1     ferrous gluconate (FERGON) 324 (38 Fe) MG tablet Take 1 tablet (324 mg) by mouth every other day 100 tablet 0     metoprolol tartrate (LOPRESSOR) 25 MG tablet Take 0.5 tablets (12.5 mg) by mouth 2 times daily 90 tablet 3     NUTRITIONAL SUPPLEMENT LIQD Take 4 oz by mouth daily Also QD PRN       Pantoprazole Sodium (PROTONIX PO) Take 40 mg by mouth daily         REVIEW OF SYSTEMS:  10 point ROS of systems including Constitutional, Eyes, Respiratory, Cardiovascular, Gastroenterology, Genitourinary, Integumentary, Muscularskeletal, Psychiatric were all negative except for pertinent positives noted in my HPI.    EXAM:  /62  Pulse 78  Temp 98.4  F (36.9  C)  Resp 16  Ht 5' 7\" (1.702 m)  Wt 148 lb 11.2 oz (67.4 kg)  BMI 23.29 kg/m2  GENERAL APPEARANCE:  Alert, in no distress  RESP:  respiratory effort and palpation of chest normal, auscultation of lungs clear , no respiratory distress  CV:  Palpation and auscultation of heart done , rate and rhythm reg, 3/6 murmur, no peripheral edema  ABDOMEN:  normal bowel sounds, soft, nontender, no hepatosplenomegaly or other masses  M/S:   Gait and station SBA, Digits and nails normal  SKIN:  Inspection and Palpation of skin and subcutaneous tissue intact  NEURO: 2-12 in normal limits and at patient's baseline  PSYCH:  insight and judgement, memory good , affect and mood normal      DIAGNOSTICS:   Preop Testing   EKG: Not indicated due to non-vascular surgery and low risk of event (age <65 and without " cardiac risk factors)  Hemoglobin (indicated for history of anemia or procedure with significant blood loss such as tonsillectomy, major intraperitoneal surgery, vascular surgery, major spine surgery, total joint replacement)  Serum Potassium  Serum Creatinine    Creatinine   Date Value Ref Range Status   05/18/2018 1.81 (H) 0.66 - 1.25 mg/dL Final     Potassium   Date Value Ref Range Status   05/18/2018 4.0 3.4 - 5.3 mmol/L Final       IMPRESSION:  Reason for surgery/procedure: urinary retention  Diagnosis/reason for consult: urinary retention    The proposed surgical procedure is considered LOW risk.    For above listed surgery and anesthesia:   Patient is at MODERATE risk for surgery/procedure and perioperative/procedure complications.    RECOMMENDATIONS:    --Approval given to proceed with proposed procedure, without further diagnostic evaluation  I do have HGB ordered for 6/5.     Signed Electronically by: DEMETRA Yates CNP        Sincerely,        DEMETRA Yates CNP

## 2018-06-04 PROBLEM — A41.9 SEPSIS (H): Status: ACTIVE | Noted: 2018-01-01

## 2018-06-04 PROBLEM — I21.4 NSTEMI (NON-ST ELEVATED MYOCARDIAL INFARCTION) (H): Status: ACTIVE | Noted: 2018-01-01

## 2018-06-04 PROBLEM — R65.21 SEPTIC SHOCK (H): Status: ACTIVE | Noted: 2018-01-01

## 2018-06-04 PROBLEM — E87.20 METABOLIC ACIDOSIS: Status: ACTIVE | Noted: 2018-01-01

## 2018-06-04 PROBLEM — N18.9 ACUTE KIDNEY INJURY SUPERIMPOSED ON CKD (H): Status: ACTIVE | Noted: 2018-01-01

## 2018-06-04 PROBLEM — R00.1 SINUS BRADYCARDIA: Status: ACTIVE | Noted: 2018-01-01

## 2018-06-04 PROBLEM — R00.0 SINUS TACHYCARDIA: Status: ACTIVE | Noted: 2018-01-01

## 2018-06-04 PROBLEM — E87.20 LACTIC ACIDOSIS: Status: ACTIVE | Noted: 2018-01-01

## 2018-06-04 PROBLEM — N17.9 ACUTE KIDNEY INJURY SUPERIMPOSED ON CKD (H): Status: ACTIVE | Noted: 2018-01-01

## 2018-06-04 PROBLEM — A41.9 SEPTIC SHOCK (H): Status: ACTIVE | Noted: 2018-01-01

## 2018-06-04 NOTE — PLAN OF CARE
Transfer from  PACU to Room 218  Transferred to bed via transfer board (Glyder Mat,Transfer Boar,Slider Sheet)    S: 89 y/o male  S/P cystoscopy and ureteral dilation       Anesthesia Type:  general       Surgeon:  Dr. 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, or unspecified(403.00)      Prostate cancer (H) March 2010    Geoffrey 6 (3+3)     Sprain of anterior cruciate ligament of left knee, subsequent encounter         (CHF; Heart Disease; Lung Disease; Chronic Pain; Diabetes; Other (Comment)          Surgical History:    Past Surgical History:   Procedure Laterality Date     MOHS MICROGRAPHIC PROCEDURE  06/25/12    right nose lesion     PHACOEMULSIFICATION WITH STANDARD INTRAOCULAR LENS IMPLANT  4/18/2013    Procedure: PHACOEMULSIFICATION WITH STANDARD INTRAOCULAR LENS IMPLANT;  PHACOEMULSIFICATION WITH STANDARD INTRAOCULAR LENS IMPLANT RIGHT EYE;  Surgeon: Collin Booth MD;  Location: [facility] OR     TURP  08/26/10         A:  EBL: unknown        IVF:  500cc        UOP:  400        NPO:  ___Yes _x_No         Vomiting:  ___Yes x___No         Drainage: none urine orange/natacha        Skin Integrity: fragile skin, skin tear right wrist (Normal; Pressure Ulcer (Location)        RFO: _x__Yes___No (identify item if present) Catheter        SSI Patient?  ___Yes_x__No (if yes, see checklist for actions)        Brace/sling/equipment:  ___Yes_x__No (identify item if present)         See PACU record for ongoing assessment, vital signs and pain assessment.    R: Post-Op vitals and assessments as ordered/indicated per patient's condition.       Follow Post-Op orders and notify Physician prn.        Continue to involve patient/family in plan of care and discharge planning.       Reinforce Pre-Operative education.       Implement skin safety interventions as appropriate.    Name: Marisol Li RN

## 2018-06-04 NOTE — OP NOTE
Procedure Date: 2018      PROCEDURE:  Cystoscopy, urethral dilation and Harris catheter placement.      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      OPERATIVE NOTE:  Informed consent was obtained and the patient was brought to the Operating Room, given laryngeal mask anesthesia and placed in lithotomy position.  Sterile prep and drape were applied and surgical timeout was taken.  Cystoscope was introduced into the urethra and the stricture was encountered at the bulbous urethra.  A guidewire was advanced through this up into the bladder and the cystoscope was removed.  Umanzor sounds were used to dilate the stricture up to 24 Mosotho without difficulty.  An 18-Mosotho Councill catheter was then placed over the guidewire up into the bladder.  Guidewire was removed.  The balloon was inflated with 10 mL of sterile water and the procedure was terminated at that point.  He tolerated the procedure well.  There were no complications, no blood loss.  We will plan on leaving this Harris catheter in place for 3 weeks.  He will go back to the nursing home with it today.  Keflex t.i.d. x 5 days and they will remove the catheter at the [facility] on 18.  He should follow up in clinic after  for his routine prostate cancer followup and injection.         WILMER LARA MD             D: 2018   T: 2018   MT: JUNIOR      Name:     DELVIN MEAD   MRN:      [id number]        Account:        [id number]   :      1925           Procedure Date: 2018      Document: [id number]       cc: Wilmer Lara MD

## 2018-06-04 NOTE — IP AVS SNAPSHOT
MRN:[id number]                      After Visit Summary   6/4/2018    Tristen Canela    MRN: [id number]           Thank you!     Thank you for choosing [facility] for your care. Our goal is always to provide you with excellent care. Hearing back from our patients is one way we can continue to improve our services. Please take a few minutes to complete the written survey that you may receive in the mail after you visit with us. Thank you!        Patient Information     Date Of Birth          1/20/1925        About your hospital stay     You were admitted on:  June 4, 2018 You last received care in the:  [facility]    You were discharged on:  June 4, 2018       Who to Call     For medical emergencies, please call 911.  For non-urgent questions about your medical care, please call your primary care provider or clinic, [phone]  For questions related to your surgery, please call your surgery clinic        Attending Provider     Provider Specialty    Wilmer Keyes MD Urology       Primary Care Provider Office Phone # Fax #    Luis Miguel Watson [phone]      After Care Instructions     Diet Instructions       Resume pre procedure diet            Discharge Instructions       Patient to arrange for follow up appointment AFTER 7/26/18 for prostate cancer follow up.            Encourage fluids       Encourage fluids at home to keep urine clear to light pink                  Your next 10 appointments already scheduled     Jun 14, 2018  9:00 AM CDT   [facility] Visit with Eusebio Grove DO   [facility]    [address] [place] [id number] [phone]            Jun 26, 2018  1:20 PM CDT   Office Visit with Luis Miguel Watson MD   [facility] ([address] [id number] [phone]           Bring a current list  of meds and any records pertaining to this visit. For Physicals, please bring immunization records and any forms needing to be filled out. Please arrive 10 minutes early to complete paperwork.            Jul 11, 2018  9:20 AM CDT   [facility] Luis Miguel [facility] MD   [facility]    [address] [place] [id number]   [phone]              Pending Results     Date and Time Order Name Status Description    6/5/2018 0600 Hemoglobin In process             Admission Information     Date & Time Provider Department Dept. Phone    6/4/2018 Wilmer Keyes MD [facility] [phone]      Your Vitals Were     Blood Pressure Pulse Temperature Respirations Pulse Oximetry       130/95 77 97.8  F (36.6  C) (Oral) 16 99%       MyChart Information     [employer] gives you secure access to your electronic health record. If you see a primary care provider, you can also send messages to your care team and make appointments. If you have questions, please call your primary care clinic.  If you do not have a primary care provider, please call [phone] and they will assist you.        Care EveryWhere ID     This is your Care EveryWhere ID. This could be used by other organizations to access your [facility] [place] medical records  [id number]        Equal Access to Services     ANG MAYS : Hadii tina barragan hadasho Soomaali, waaxda luqadaha, qaybta kaalmada adeegyada, lloyd corcoran. So [facility] [phone].    ATENCIÓN: Si habla español, tiene a frank disposición servicios gratuitos de asistencia lingüística. Llame al [phone].    We comply with applicable federal civil rights laws and [place] laws. We do not discriminate on the basis of race, color, national origin, age, disability, sex, sexual orientation, or gender identity.               Review of your medicines      CONTINUE these medicines which have NOT CHANGED         Dose / Directions    aspirin 81 MG EC tablet   Used for:  Coronary artery disease involving native coronary artery of native heart without angina pectoris        Dose:  81 mg   Take 1 tablet (81 mg) by mouth daily   Quantity:  90 tablet   Refills:  1       ferrous gluconate 324 (38 Fe) MG tablet   Commonly known as:  FERGON   Used for:  Low iron        Dose:  324 mg   Take 1 tablet (324 mg) by mouth every other day   Quantity:  100 tablet   Refills:  0       metoprolol tartrate 25 MG tablet   Commonly known as:  LOPRESSOR   Used for:  Congestive heart failure, unspecified congestive heart failure chronicity, unspecified congestive heart failure type (H)        Dose:  12.5 mg   Take 0.5 tablets (12.5 mg) by mouth 2 times daily   Quantity:  90 tablet   Refills:  3       NUTRITIONAL SUPPLEMENT Liqd        Dose:  4 oz   Take 4 oz by mouth daily Also QD PRN   Refills:  0       PROTONIX PO        Dose:  40 mg   Take 40 mg by mouth daily   Refills:  0       TYLENOL PO        Dose:  650 mg   Take 650 mg by mouth 2 times daily as needed for mild pain or fever (also TID scheduled)   Refills:  0                Protect others around you: Learn how to safely use, store and throw away your medicines at www.disposemymeds.org.             Medication List: This is a list of all your medications and when to take them. Check marks below indicate your daily home schedule. Keep this list as a reference.      Medications           Morning Afternoon Evening Bedtime As Needed    aspirin 81 MG EC tablet   Take 1 tablet (81 mg) by mouth daily                                ferrous gluconate 324 (38 Fe) MG tablet   Commonly known as:  FERGON   Take 1 tablet (324 mg) by mouth every other day                                metoprolol tartrate 25 MG tablet   Commonly known as:  LOPRESSOR   Take 0.5 tablets (12.5 mg) by mouth 2 times daily                                NUTRITIONAL SUPPLEMENT Liqd   Take 4 oz by mouth daily Also QD PRN                                 PROTONIX PO   Take 40 mg by mouth daily                                TYLENOL PO   Take 650 mg by mouth 2 times daily as needed for mild pain or fever (also TID scheduled)

## 2018-06-04 NOTE — H&P
[facility]    History and Physical  Hospitalist       Date of Admission:  6/4/2018    Assessment & Plan   Tristen Canela is a 89 year old male who presents with septic shock secondary to UTI s/p outpatient urologic procedure (dilatation of ureteral stricture) 06/04/18.    Septic shock secondary to UTI s/p dilatation of ureteral stricture; lactic acidosis  - Per discussion with Urologist Dr. Keyes, the patient had at least 850mL of retained urine in his grossly-distended bladder prior to the urologic procedure; the patient will need to keep an indwelling garsia in place for at least 3weeks' time  - Afebrile (temp 97.5F) but SOFA score 4 (respiratory distress, DOROTHY, hypotension) = 20% predicted ICU mortality  - BPs labile and difficult to attain accurate measurements while in PACU  - UA showed protein 100, large blood, moderate leuk esterase, >182 WBCs, >182 RBCs, few bacteria, and WBC clumps  - Urine and blood cultures pending  - Leukocytosis to WBC 13  - Lactic acid 11.9 --> 16  - Admit to INPATIENT status in the ICU for close monitoring and pressor support  - Place central line to monitor CVP and start IV Levophed drip; add IV Vasopressin if needed to maintain MAPs >65  - Also place arterial line for close monitoring  - Start IV Vanco/Zosyn broad-spectrum empiric abx Tx  - Check lactate, BMP, and ABG q4h  - Closely monitor clinical status  - Consulted Tele-ICU and Urology for assistance; appreciate input    Tachypnea with increased WOB as a compensatory mechanism to counteract metabolic acidosis  - ABG: pH 7.21, pCO2 17, pO2 115, bicarb 7, and base deficit 19.4  - Give bicarb inj 25meq x1 now  - Start BiPAP to help relieve work of breathing  - Check ABG q4h tonight while on BiPAP  - Wean O2 as tolerated  - Discussed possibility of pulmonary embolism with Tele-ICU, but on-call intensivist did not feel that a CTA is warranted at this time, given that a pulmonary embolus is lower on  the differential  - Continue to closely monitor respiratory status    NSTEMI secondary to septic shock  - Troponin up to 1.991; cycle cardiac enzymes  - Start heparin drip  - Monitor on telemetry    Acute-on-chronic combined systolic and diastolic CHF, exacerbated by NSTEMI  - BNP >49872 after receiving IV NS 500mL mini-bolus x1 during cystoscopy  - Mild vascular congestion seen on CXR  - Tele-ICU recommends infusing another IV NS 500mL mini-bolus x1 in setting of septic shock  - Monitor daily weights and strict I/Os    Sinus tachycardia alternating with sinus bradycardia s/p urologic procedure; known essential hypertension at baseline  - Received metoprolol 12.5mg po x1 in PACU with mild improvement in tachycardia and no bradycardia noted for at least an hour after administration  - Labile BP measurements (sometimes hypertensive and sometimes hypotensive) while in PACU, with significant component secondary to inability to obtain accurate BP measurements   - Will hold further beta-blockade for now in setting of septic shock  - Closely monitor vital signs (will attempt to obtain more accurate readings while in ICU) and give PRN IVF mini-boluses    DOROTHY superimposed on [id number] (baseline Cr 1.5-1.6), secondary to septic shock  - Cr 2.15 with GFR 29  - Trend renal indices with pressor support and gentle IVF mini-boluses    New left basilar opacity seen on CXR  - Could represent atelectasis, infiltrate, and/or effusion  - On broad-spectrum abx as above  - Check respiratory filmarray  - Check procalcitonin and CRP  - Trending lactate as above    DVT Prophylaxis: heparin drip  Code Status: DNR / DNI    Disposition: Patient is anticipated to require >2nights in the hospital for management of all the complex issues above.    At least 120minutes of Critical Care time was spent caring for this patient, at least 50% of which consisted of care coordination and counseling.    Ligia Lombardo MD    Primary Care Physician   Dr. Bolanos  "ROSANGELA Watson    Chief Complaint   \"increased work of breathing, labile BPs, and sinus tachycardia alternating with sinus bradycardia s/p outpatient urologic procedure (dilatation of ureteral stricture)\"    History is obtained from the patient, PACU nurses, and urologist Dr. Keyes    History of Present Illness   Tristen Canela is a 89 year old male who presents from PACU with increased work of breathing, labile BPs, and sinus tachycardia alternating with sinus bradycardia s/p outpatient urologic procedure (dilatation of ureteral stricture) 06/04/18. Per discussion with Urologist Dr. Keyes, the patient had at least 850mL of retained urine in his grossly-distended bladder prior to the urologic procedure; the patient will need to keep an indwelling garsia in place for at least 3weeks' time after today's procedure. The patient received a total of 500mL of IV NS during the procedure. He was found to be persistently tachypneic to RR 35-40s for 2hours following the procedure. PACU nurses had difficulty obtaining reliable/accurate BP measurements and O2 sat measurements, as the readings appeared to vary erratically from one moment to the next. The patient's HR was initially found to be in the 50s, but then was found to spike up to the 140s. He remained in sinus rhythm on the telemetry monitor with PACs/PVCs. Approx 2 hours after the procedure, his HR was noted to be maintained either in the 90s or >100 (no further bradycardia was noted) with associated hypertension (SBP 160s-170). A single dose of his home oral metoprolol 12.5mg was given x1 in PACU with improvement in HR to 90s-120s.   Of note, the patient has hx prostate CA for which he has been receiving Lupron shots for many years.  Had extensive bedside discussion of the above events with several of the patient's family members; discussed recommendations to admit the patient to the ICU for close monitoring and medical management overnight; family agreed and all " questions were answered to their satisfaction. Family re-iterated the patient's wishes for DNR/DNI.    Past Medical History    I have reviewed this patient's medical history and updated it with pertinent information if needed.   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  Past Surgical History:   Procedure Laterality Date     MOHS MICROGRAPHIC PROCEDURE  06/25/12    right nose lesion     PHACOEMULSIFICATION WITH STANDARD INTRAOCULAR LENS IMPLANT  4/18/2013    Procedure: PHACOEMULSIFICATION WITH STANDARD INTRAOCULAR LENS IMPLANT;  PHACOEMULSIFICATION WITH STANDARD INTRAOCULAR LENS IMPLANT RIGHT EYE;  Surgeon: Collin Booth MD;  Location: [facility] OR     TURP  08/26/10     Prior to Admission Medications   Prior to Admission Medications   Prescriptions Last Dose Informant Patient Reported? Taking?   Acetaminophen (TYLENOL PO) 6/4/2018 at 0800  Yes Yes   Sig: Take 650 mg by mouth 2 times daily as needed for mild pain or fever (also TID scheduled)   NUTRITIONAL SUPPLEMENT LIQD Unknown at Unknown time  Yes No   Sig: Take 4 oz by mouth daily Also QD PRN   Pantoprazole Sodium (PROTONIX PO) 6/4/2018 at 0530  Yes Yes   Sig: Take 40 mg by mouth daily   aspirin 81 MG EC tablet 5/31/18  No No   Sig: Take 1 tablet (81 mg) by mouth daily   ferrous gluconate (FERGON) 324 (38 Fe) MG tablet 6/3/2018 at Unknown time  No Yes   Sig: Take 1 tablet (324 mg) by mouth every other day   metoprolol tartrate (LOPRESSOR) 25 MG tablet 6/4/2018 at 0800  No Yes    Sig: Take 0.5 tablets (12.5 mg) by mouth 2 times daily      Facility-Administered Medications: None     Allergies   Allergies   Allergen Reactions     No Known Drug Allergies      Social History   I have reviewed this patient's social history and updated it with pertinent information if needed. Tristen Canela  reports that he has never smoked. He has never used smokeless tobacco. He reports that he does not drink alcohol or use illicit drugs.    Family History   I have reviewed this patient's family history and updated it with pertinent information if needed.   Family History   Problem Relation Age of Onset     OSTEOPOROSIS Mother      Asthma Maternal Grandmother      HEART DISEASE Maternal Grandmother      CHF     Prostate Cancer Brother      CANCER Sister      ovarian     CANCER Sister      derm     OSTEOPOROSIS Sister      OSTEOPOROSIS Sister      Review of Systems   The 10 point Review of Systems is negative other than noted in the HPI.    Physical Exam   Temp: 97.8  F (36.6  C) Temp src: Oral BP: (!) 112/93 Pulse: 99 Heart Rate: 122 Resp: (!) 39 SpO2: 96 % O2 Device: None (Room air) Oxygen Delivery: 10 LPM  Vital Signs with Ranges  Temp:  [97.8  F (36.6  C)-98.7  F (37.1  C)] 97.8  F (36.6  C)  Pulse:  [] 99  Heart Rate:  [] 122  Resp:  [14-61] 39  BP: ()/() 112/93  SpO2:  [59 %-100 %] 96 %  149 lbs 7.55 oz    Constitutional: Well-developed, appears pale and toxic  HEENT: NCAT, EOMI, PERRL, neck supple/symmetric  Respiratory: Tachypneic to RR 35-40s with increased WOB, diminished aeration of L lung base, faint diffuse crackles, no rales/rhonchi/wheezes  Cardiovascular: S1S2 tachycardic with HR 100s-120s, no murmurs/rubs/gallops, no JVD  GI: Normoactive BS, soft, NTND  Lymph/Hematologic: No cervical lymphadenopathy  Genitourinary: Harris in place draining dark reddish-brown urine  Skin: No rash, no open areas of skin  Musculoskeletal: Moving all extremities with 4/5 strength in  bed  Neurologic: Arousable to voice but somnolent (falls asleep mid-sentence), no focal neuro deficits identified  Psychiatric: Mood and affect appropriate, cooperative    Data   Data reviewed today:  I personally reviewed the EKG tracing showing sinus tachycardia to  and the chest x-ray image(s) showing new L basilar infiltrate and mild vascular congestion.    Recent Labs  Lab 06/04/18  1620 05/31/18  0613 05/29/18  0625   WBC 13.0*  --  14.6*   HGB 8.2* 8.4* 7.9*     --   --      --   --      --   --    POTASSIUM 5.3  --   --    CHLORIDE 105  --   --    CO2 16*  --   --    BUN 41*  --   --    CR 2.15*  --   --    ANIONGAP 19*  --   --    ESTEPHANIE 8.2*  --   --    *  --   --    ALBUMIN 2.3*  --   --    PROTTOTAL 6.3*  --   --    BILITOTAL 0.8  --   --    ALKPHOS 129  --   --    ALT 51  --   --    AST 70*  --   --    TROPI 1.991*  --   --      Recent Results (from the past 24 hour(s))   XR Chest Port 1 View    Narrative    PORTABLE CHEST ONE VIEW   6/4/2018 4:27 PM     HISTORY: Desaturations.    COMPARISON: Chest x-rays dated 4/20/2015.    FINDINGS: There is a new left basilar opacity which could represent  pneumonia in the appropriate clinical setting. There is broad-based  extra convex curvature of the spine which is slightly accentuated  since the prior study and could be due to positioning of the patient.  Right lung remains clear. There are thoracic aortic calcifications.  Heart size is difficult to evaluate given the AP technique and given  the left basilar opacity. There is mild vascular congestion. There is  a possible left pleural fluid collection. No fracture. Diffuse  osteopenia and degenerative changes of the bilateral shoulders are  again noted.      Impression    IMPRESSION:   1. New left basilar opacity could represent atelectasis, infiltrate,  and/or effusion.  2. Mild vascular congestion. Cardiac silhouette is difficult to  evaluate given the left base opacity. Congestive  heart failure is  difficult to exclude  3. Diffuse osteopenia and severe degenerative changes in the bilateral  shoulders are again noted.  4. Aortic atherosclerosis is again seen.    RIGO PATTON MD

## 2018-06-04 NOTE — ANESTHESIA PREPROCEDURE EVALUATION
Anesthesia Evaluation     . Pt has had prior anesthetic.            ROS/MED HX    ENT/Pulmonary:  - neg pulmonary ROS     Neurologic:  - neg neurologic ROS     Cardiovascular:     (+) hypertension--CAD, --. : . CHF . . :. valvular problems/murmurs .      Congenital heart disease: mitral valve insufficiency    METS/Exercise Tolerance:     Hematologic:     (+) Anemia, History of Transfusion no previous transfusion reaction -      Musculoskeletal: Comment: Minimal mobility         GI/Hepatic: Comment: 5/24/18 bloody emesis, 2 units rbc's transfused, stable         Renal/Genitourinary:     (+) chronic renal disease, type: CRI,       Endo:  - neg endo ROS       Psychiatric:  - neg psychiatric ROS       Infectious Disease:  - neg infectious disease ROS       Malignancy:   (+) Malignancy History of Prostate  Prostate CA status post Surgery,         Other:    - neg other ROS                 Physical Exam  Normal systems: cardiovascular and pulmonary    Airway   Mallampati: II  TM distance: >3 FB  Neck ROM: full    Dental   (+) upper dentures and lower dentures    Cardiovascular   Rhythm and rate: regular and normal      Pulmonary    breath sounds clear to auscultation                    Anesthesia Plan      History & Physical Review  History and physical reviewed and following examination; no interval change.    ASA Status:  3 .    NPO Status:  > 6 hours    Plan for General and LMA with Etomidate induction. Maintenance will be Balanced.    PONV prophylaxis:  Ondansetron (or other 5HT-3)       Postoperative Care  Postoperative pain management:  IV analgesics.      Consents  Anesthetic plan, risks, benefits and alternatives discussed with:  Patient.  Use of blood products discussed: No .   .                          .

## 2018-06-04 NOTE — PROGRESS NOTES
TELE ICU    89 hx of prostate ca admit by Urology for urinary obstruction and cystoscopy - patient with hemodynamic decompensation with tach-janiya and hypotension as well increase WOB.  Labs notable tLA 12 DOROTHY , leukocytosis and trop ~2 - picture concerning for evolving septic shock with subsequent T2 NSTEMI. Depressed EF noted on prior ECHO.  Discussed with hospitalist. Recommend treating as such. Concern alternating rhythm may be consequence of electrolyte or acid base disturbance     Rec:  - broad spectrum abx with vanc/zosyn  - central access and Vasopressor therapy for hypotension   - serial labs - follow trajectory of electrolytes  - serial ABG - if ph < 7.1 consider 1 amp hco   - BIPAP to assist with WOB   - consider abx film look for free air.   - tele icu continue to follow    Raheem Turner      Update  Patient with ph 7.21 LA 16 - all metabolic acidosis with resp compensation.   Ordered D5W Na HCO3 150 meq   Calcium gluconate 1 gm - for hyperkalemia     Camera'd in with bedside nurse.     Raheem Turner

## 2018-06-04 NOTE — ANESTHESIA PREPROCEDURE EVALUATION
Anesthesia Evaluation     . Pt has had prior anesthetic.            ROS/MED HX    ENT/Pulmonary:  - neg pulmonary ROS     Neurologic:  - neg neurologic ROS     Cardiovascular:     (+) hypertension--CAD, --. : . CHF . . :. valvular problems/murmurs .      Congenital heart disease: mitral valve insufficiency    METS/Exercise Tolerance:     Hematologic:     (+) Anemia, History of Transfusion no previous transfusion reaction -      Musculoskeletal: Comment: Minimal mobility         GI/Hepatic: Comment: 5/24/18 bloody emesis, 2 units rbc's transfused, stable         Renal/Genitourinary:     (+) chronic renal disease, type: CRI,       Endo:  - neg endo ROS       Psychiatric:  - neg psychiatric ROS       Infectious Disease:  - neg infectious disease ROS       Malignancy:   (+) Malignancy History of Prostate  Prostate CA status post Surgery,         Other:    - neg other ROS                 Physical Exam      Airway   Mallampati: II  TM distance: >3 FB  Neck ROM: full    Dental   (+) missing and loose    Cardiovascular   Rhythm and rate: irregular and abnormal  (+) weak pulses       Pulmonary (+) decreased breath sounds and wheezes       Other findings: Decreased breath sounds on the left, expiratory wheezing.  Patient on BIPAP see resp notes for settings.  BP 70/30 's RR 40-50 O2 sats 94%                Anesthesia Plan  Procedure only, no anesthetic delivered    History & Physical Review  History and physical reviewed and following examination; no interval change.    ASA Status:  4 emergent.             Postoperative Care      Consents                          .

## 2018-06-04 NOTE — PLAN OF CARE
1515: Patient work of breathing increased, 's, blood pressures varying but mainly hypotensive. Obtained and EKG and notified Anesthesia. Anesthesia consulted with the hospitalist who then came to patients bedside. Chest xray and labs were obtained. Patient had many critical labs. RT was called to get arterial blood gases. Patient was then accepted as an ICU patient and respiratory helped transfer patient to the ICU.   Patient was constantly being monitored at bedside.   Marisol Li RN

## 2018-06-04 NOTE — BRIEF OP NOTE
[facility] Urology Brief Operative Note    Pre-operative diagnosis: Urethral stricture   Post-operative diagnosis: Same   Procedure: Procedure(s):  COMBINED CYSTOSCOPY, DILATE URETER(S)  INSERT CATHETER BLADDER   Surgeon: Wilmer Keyes MD, MD   Assistant(s): none   Anesthesia: LMA   Estimated blood loss: None   Total IV fluids: (See anesthesia record)   Blood transfusion: No transfusion was given during surgery   Total urine output: Not measured   Drains: Harris catheter   Specimens: None   Implants: None   Findings: See dictation.   Complications: None   Condition: Stable   Comments: See dictated operative report for full details.    Wilmer Keyes MD

## 2018-06-04 NOTE — ANESTHESIA CARE TRANSFER NOTE
Patient: Tristen Canela    Procedure(s):  cystoscopy, urethral dilatation, garsia catheter placement - Wound Class: II-Clean Contaminated   - Wound Class: II-Clean Contaminated    Diagnosis: urinary retention, carcinoma of prostate  Diagnosis Additional Information: No value filed.    Anesthesia Type:   General, LMA     Note:  Airway :Face Mask  Patient transferred to:PACU  Handoff Report: Identifed the Patient, Identified the Reponsible Provider, Reviewed the pertinent medical history, Discussed the surgical course, Reviewed Intra-OP anesthesia mangement and issues during anesthesia, Set expectations for post-procedure period and Allowed opportunity for questions and acknowledgement of understanding      Vitals: (Last set prior to Anesthesia Care Transfer)    CRNA VITALS  6/4/2018 1310 - 6/4/2018 1350      6/4/2018             Pulse: 85    SpO2: 100 %                Electronically Signed By: DEMETRA Tolliver CRNA  June 4, 2018  1:50 PM

## 2018-06-04 NOTE — ANESTHESIA POSTPROCEDURE EVALUATION
Patient: Tristen Canela    Procedure(s):  cystoscopy, urethral dilatation, garsia catheter placement - Wound Class: II-Clean Contaminated   - Wound Class: II-Clean Contaminated    Diagnosis:urinary retention, carcinoma of prostate  Diagnosis Additional Information: No value filed.    Anesthesia Type:  General, LMA    Note:  Anesthesia Post Evaluation    Patient location during evaluation: PACU  Patient participation: Unable to participate in evaluation secondary to underlying medical condition  Level of consciousness: anxious  Pain management: unable to assess  Airway patency: patent  Cardiovascular status: tachycardic  Respiratory status: nasal cannula and spontaneous ventilation  PONV: none       Comments: Patient with RR 40-50 O2 sats 89-96 with poor wave form.  Called by PACU RN for patient having bradycardic/tachycardic episodes.  Patient denies chest pain or SOB at this time.  Dr. Lombardo at bedside to evaluate patient.  Chest x-ray ordered, 12 lead EKG obtained, evening dose of metoprolol 12.5 mg ordered.  Will continue to follow patient as necessary, care transferred to Dr. Lombardo at this time.          Last vitals:  Vitals:    06/04/18 1550 06/04/18 1555 06/04/18 1600   BP: 143/79  141/85   Pulse:      Resp:      Temp:      SpO2: 95% 95% 96%         Electronically Signed By: DEMETRA Huffman CRNA  June 4, 2018  4:09 PM

## 2018-06-04 NOTE — OR NURSING
Verbal report received from Beverly ARAGON RN and Bryan ESCOBAR. Phase 1 recovery assumed by Radha PERALES. Pt post-op general anesthesia for cystoscopy and garsia palcement per .

## 2018-06-04 NOTE — CONSULTS
Pharmacy Vancomycin Initial Note  Date of Service 2018  Patient's  1925  89 year old, male    Indication: Sepsis and Urinary Tract Infection    Current estimated CrCl = Estimated Creatinine Clearance: 20.6 mL/min (based on Cr of 2.15).    Creatinine for last 3 days  2018:  4:20 PM Creatinine 2.15 mg/dL    Recent Vancomycin Level(s) for last 3 days  No results found for requested labs within last 72 hours.      Vancomycin IV Administrations (past 72 hours)      No vancomycin orders with administrations in past 72 hours.                Nephrotoxins and other renal medications (Future)    Start     Dose/Rate Route Frequency Ordered Stop    18 1900  vancomycin (VANCOCIN) 1,500 mg in sodium chloride 0.9 % 250 mL intermittent infusion      1,500 mg  over 90 Minutes Intravenous EVERY 48 HOURS 18 1751      18 1800  piperacillin-tazobactam (ZOSYN) infusion 2.25 g      2.25 g  100 mL/hr over 30 Minutes Intravenous EVERY 6 HOURS 18 1742      18 1730  norepinephrine (LEVOPHED) 16 mg in D5W 250 mL infusion      0.03-0.4 mcg/kg/min × 67.5 kg  1.9-25.3 mL/hr  Intravenous CONTINUOUS 18 1728            Contrast Orders - past 72 hours     None                Plan:  1.  Start vancomycin  1500 mg IV q48h.   2.  Goal Trough Level: 15-20 mg/L   3.  Pharmacy will check trough levels as appropriate in 1-3 Days.    4. Serum creatinine levels will be ordered daily for the first week of therapy and at least twice weekly for subsequent weeks.    5. [place] method utilized to dose vancomycin therapy: Method 2    Josse Fierro

## 2018-06-04 NOTE — ANESTHESIA POSTPROCEDURE EVALUATION
Patient: Tristen Canela    Procedure(s):  cystoscopy, urethral dilatation, garsia catheter placement - Wound Class: II-Clean Contaminated   - Wound Class: II-Clean Contaminated    Diagnosis:urinary retention, carcinoma of prostate  Diagnosis Additional Information: No value filed.    Anesthesia Type:  General, LMA    Note:  Anesthesia Post Evaluation    Patient location during evaluation: PACU  Patient participation: Able to fully participate in evaluation  Level of consciousness: awake  Pain management: adequate  Airway patency: patent  Cardiovascular status: acceptable and hemodynamically stable  Respiratory status: acceptable, room air and nonlabored ventilation  Hydration status: stable  PONV: none     Anesthetic complications: None    Comments: Patient was happy with the anesthesia care received and no anesthesia related complications were noted.  I will follow up with the patient again if it is needed.        Last vitals:  Vitals:    06/04/18 1410 06/04/18 1415 06/04/18 1420   BP: (!) 79/53 91/59 (!) 87/47   Pulse:      Resp: (!) 35 (!) 32 (!) 32   Temp:      SpO2: 100% 99% 100%         Electronically Signed By: DEMETRA Tolliver CRNA  June 4, 2018  2:30 PM

## 2018-06-04 NOTE — IP AVS SNAPSHOT
[facility]    [address] [place] [address]    Phone:  [phone]                                       After Visit Summary   6/4/2018    Tristen Canela    MRN: [id number]           After Visit Summary Signature Page     I have received my discharge instructions, and my questions have been answered. I have discussed any challenges I see with this plan with the nurse or doctor.    ..........................................................................................................................................  Patient/Patient Representative Signature      ..........................................................................................................................................  Patient Representative Print Name and Relationship to Patient    ..................................................               ................................................  Date                                            Time    ..........................................................................................................................................  Reviewed by Signature/Title    ...................................................              ..............................................  Date                                                            Time

## 2018-06-05 VITALS
WEIGHT: 149.47 LBS | OXYGEN SATURATION: 95 % | SYSTOLIC BLOOD PRESSURE: 105 MMHG | BODY MASS INDEX: 23.41 KG/M2 | DIASTOLIC BLOOD PRESSURE: 70 MMHG | HEART RATE: 99 BPM | TEMPERATURE: 97.8 F

## 2018-06-05 LAB
BACTERIA SPEC CULT: NORMAL
FLUAV H1 2009 PAND RNA SPEC QL NAA+PROBE: NEGATIVE
FLUAV H1 RNA SPEC QL NAA+PROBE: NEGATIVE
FLUAV H3 RNA SPEC QL NAA+PROBE: NEGATIVE
FLUAV RNA SPEC QL NAA+PROBE: NEGATIVE
FLUBV RNA SPEC QL NAA+PROBE: NEGATIVE
HADV DNA SPEC QL NAA+PROBE: NEGATIVE
HADV DNA SPEC QL NAA+PROBE: NEGATIVE
HMPV RNA SPEC QL NAA+PROBE: NEGATIVE
HPIV1 RNA SPEC QL NAA+PROBE: NEGATIVE
HPIV2 RNA SPEC QL NAA+PROBE: NEGATIVE
HPIV3 RNA SPEC QL NAA+PROBE: NEGATIVE
MICROBIOLOGIST REVIEW: NORMAL
RHINOVIRUS RNA SPEC QL NAA+PROBE: NEGATIVE
RSV RNA SPEC QL NAA+PROBE: NEGATIVE
RSV RNA SPEC QL NAA+PROBE: NEGATIVE
SPECIMEN SOURCE: NORMAL
SPECIMEN SOURCE: NORMAL

## 2018-06-05 PROCEDURE — 83605 ASSAY OF LACTIC ACID: CPT | Performed by: HOSPITALIST

## 2018-06-05 NOTE — ANESTHESIA CARE TRANSFER NOTE
Patient: Tristen Canela    * No procedures listed *    Diagnosis: * No pre-op diagnosis entered *  Diagnosis Additional Information: No value filed.    Anesthesia Type:   No value filed.     Note:    Patient transferred to:ICU  ICU Handoff: Call for PAUSE to initiate/utilize ICU HANDOFF, Identified Patient, Identified Responsible Provider, Reviewed the Pertinent Medical History, Discussed Surgical Course, Reviewed Intra-OP Anesthesia Management and Issues during Anesthesia, Set Expectations for Post Procedure Period and Allowed Opportunity for Questions and Acknowledgement of Understanding      Vitals: (Last set prior to Anesthesia Care Transfer)              Electronically Signed By: DEMETRA Trevizo CRNA  June 5, 2018  6:46 PM

## 2018-06-05 NOTE — PROGRESS NOTES
89-year-old male who had elective urethral dilatation performed this afternoon under anesthesia and developed episodes of tachycardia and hypotension.  Afterwards there were having difficulty getting reliable blood pressure readings.  Laboratory work was abnormal with lactic acidosis, metabolic acidosis, elevated troponin with EKG showing  ST depression in the lateral leads.   He is been admitted to the ICU for treatment of septic shock with non-STEMI.  Anesthesia has placed a CVP line.  Unable to place an arterial line.  He has been on BiPAP support at 100% oxygen maintain sats in the low 90s.  Currently on pressure support with Levophed and vasopressin at max doses with blood pressures adequate with map in the low 70s.  He is being covered with broad-spectrum antibiotics with Vanco and Zosyn.  He has been started on a heparin infusion.  He is on a bicarb infusion to help correct the metabolic acidosis.  This case has been discussed with telemetry ICU and unfortunately because of his DNR/DNI status, there is probably not much more that can be done at this point.  I have met with the family and discuss his poor prognosis and will need to follow him along.  Electronically signed by BOBY Guzman on June 4, 2018

## 2018-06-05 NOTE — PROGRESS NOTES
Many family members present and taking turns seeing pt since admission but entire family was called to bedside by writer as heart rate and respiratory rate have decreased suddenly. Heart rate now in the 30's with respiratory rate 10-12. Dr. Guzman notified and in with family.

## 2018-06-05 NOTE — ANESTHESIA POSTPROCEDURE EVALUATION
Patient: Tristen Canela    * No procedures listed *    Diagnosis:* No pre-op diagnosis entered *  Diagnosis Additional Information: No value filed.    Anesthesia Type:  No value filed.    Note:  Anesthesia Post Evaluation       Anesthetic complications: yes  Specific anesthetic complications: Death    Comments: Pt. admitted to ICU after having a cardiac event in the PACU 6/4/18, central lines was placed, Pt was DNI DNR        Last vitals:  There were no vitals filed for this visit.      Electronically Signed By: DEMETRA Trevizo CRNA  June 5, 2018  6:39 PM

## 2018-06-05 NOTE — PROGRESS NOTES
Overall declining.  Continue on max dose of levephed and vasopressin with borderline blood pressures  On bipap support, maintaining sats, but ABG with pH at 7.0, low pCO2 and bicarb.   Hyperkalemic at 6.1, low glucose at 41, rising creatinine and lactic acid  Will start dextrose, order calcium gluconate, dextrose and insulin  Continue to monitor, spoke with wife, she is happy that he does not to appear to be in pain  Electronically signed by BOBY Guzman on June 4, 2018

## 2018-06-05 NOTE — CONSULTS
Current medications were reviewed for side effect of hyperkalemia. The following medications on his current hospital encounter had some reported incidence of hyperkalemia:    Metoprolol showed a case report of hyperkalemia    Magnesium hydroxide had 2 case reports documented for hyperkalemia    Heparin - hyperkalemia has been reported in 7-8% of patients as a result of subcutaneous heparin therapy    Thank you for the consult, Please call pharmacy if you have any questions.    Josse Fierro RPH

## 2018-06-05 NOTE — ANESTHESIA PROCEDURE NOTES
CENTRAL LINE INSERTION PROCEDURE NOTE:   Pre-Procedure  Performed by  VANDA PAYTON   Location: ICU      Pre-Anesthestic Checklist: patient identified, risks and benefits discussed, informed consent, monitors and equipment checked, pre-op evaluation and at physician/surgeon's request    Timeout  Correct Patient: Yes   Correct Procedure: Yes   Correct Site: Yes   Correct Laterality: N/A   Correct Position: Yes   Site Marked: N/A   .   Procedure Documentation   Procedure: central line, new line and elective  Position: supine  Patient Prep;all elements of maximal sterile barrier technique followed, mask, hat, sterile gown, sterile gloves, draped, chlorhexidine gluconate and isopropyl alcohol, patient draped      Insertion Site:internal jugular, right    Skin infiltrated with 1 mL of 1% lidocaine.  Catheter: 7 Fr, 20 cm, T.L.  Assessment/Narrative    Catheter: 7 Fr, 20 cm, T.L.     Secured by suture  Tegaderm and Biopatch dressing used.  blood aspirated from all lumens  All lumens flushed: Yes  Verification method: X-ray and Placement to be verified post-opPerson verifying: Dr. Guzman  Comments:  1840 Patient tolerated the above procedure well. Cath tip inserted to 15 cm and secured in place with suture, and Tegaderm, bio patch applied to insertion site.      1930 Attempted insertion of radial A-line. Unable to palpate a pulse, Ultrasound was then utilized, was able to get bright red blood back from cath but unable to thread the cath.  Dr. Guzman will obtain venous blood gases.  Family would like the patient to be a Do not intubate at this time.

## 2018-06-05 NOTE — PROGRESS NOTES
.DATE: 6/4/2018    TIME OF RECEIPT FROM LAB:  2221  LAB TEST:  PH, Co2, Bicarb, Pot, Gluc, Trop  LAB VALUE:  7.01, 17, 4, 6.1, 40, 5.571  RESULTS GIVEN WITH READ-BACK TO (PROVIDER):  Dr. Guzman  TIME LAB VALUE REPORTED TO PROVIDER:   2222  NEW ORDERS: Obtained

## 2018-06-05 NOTE — PROVIDER NOTIFICATION
DATE:  6/4/2018   TIME OF RECEIPT FROM LAB:  2000  LAB TEST:  PH, bicarb  LAB VALUE:  7, 6  RESULTS GIVEN WITH READ-BACK TO (PROVIDER):  Dr. Guzman  TIME LAB VALUE REPORTED TO PROVIDER:   [place]

## 2018-06-05 NOTE — PROGRESS NOTES
MD DEATH PRONOUNCEMENT    Called to pronounce Tristen HANSEN [place] dead.    Physical Exam: Unresponsive to noxious stimuli, Spontaneous respirations absent, Breath sounds absent and Heart sounds absent    Patient was pronounced dead at 11:08 PM, 2018.    Principal Problem:    Septic shock (H)  Active Problems:    Sepsis (H)    NSTEMI (non-ST elevated myocardial infarction) (H)    Lactic acidosis    Coronary artery disease involving native coronary artery of native heart without angina pectoris    Chronic combined systolic and diastolic congestive heart failure (H)    Mitral valve insufficiency, unspecified etiology    Essential hypertension    Urinary retention    Metabolic acidosis    Acute kidney injury superimposed on CKD (H)    Sinus tachycardia    Sinus bradycardia    Prostate cancer (H)    Anemia in chronic kidney disease    CKD (chronic kidney disease) stage 3, GFR 30-59 ml/min       Infectious disease present?: NO    Communicable disease present? (examples: HIV, chicken pox, TB, Ebola, CJD) :  NO    Multi-drug resistant organism present? (example: MRSA): NO    Please consider an autopsy if any of the following exist:  YES Unexpected or unexplained death during or following any dental, medical, or surgical diagnostic treatment procedures.   NO Death of mother at or up to seven days after delivery.     NO All  and pediatric deaths.     NO Death where the cause is sufficiently obscure to delay completion of the death certificate.   NO Deaths in which autopsy would confirm a suspected illness/condition that would affect surviving family members or recipients of transplanted organs.     The following deaths must be reported to the 's Office:  YES A death that may be due entirely or in part to any factors other than natural disease (recent surgery, recent trauma, suspected abuse/neglect).   NO A death that may be an accident, suicide, or homicide.     NO Any sudden, unexpected death in which  there is no prior history of significant heart disease or any other condition associated with sudden death.   NO A death under suspicious, unusual, or unexpected circumstances.    NO Any death which is apparently due to natural causes but in which the  does not have a personal physician familiar with the patient s medical history, social, or environmental situation or the circumstances of the terminal event.   NO Any death apparently due to Sudden Infant Death Syndrome.     YES Deaths that occur during, in association with, or as consequences of a diagnostic, therapeutic, or anesthetic procedure.   NO Any death in which a fracture of a major bone has occurred within the past (6) six months.   NO A death of persons note seen by their physician within 120 days of demise.     NO Any death in which the  was an inmate of a public institution or was in the custody of Law Enforcement personnel.   NO  All unexpected deaths of children   NO Solid organ donors   NO Unidentified bodies   NO Deaths of persons whose bodies are to be cremated or otherwise disposed of so that the bodies will later be unavailable for examination;   NO Deaths unattended by a physician outside of a licensed healthcare facility or licensed residential hospice program   YES Deaths occurring within 24 hours of arrival to a health care facility if death is unexpected.    NO Deaths associated with the decedent s employment.   NO Deaths attributed to acts of terrorism.   NO Any death in which there is uncertainty as to whether it is a medical examiner s care should be discussed with the medical investigator.        Body disposition: Autopsy was discussed with family member:  Spouse in person.  Permission for autopsy was denied.  Organ donation was discussed with family member:  Spouse.  Permission for organ donation was denied.  Case referred to the .

## 2018-06-05 NOTE — DISCHARGE SUMMARY
[facility]    Death Summary  Hospitalist    Date of Admission:  6/4/2018  Date of Death:         6/4/2018  Provider Completing Death Summary: Clive Guzman    Discharge Diagnoses   Patient Active Problem List    Diagnosis Date Noted     Sepsis (H) 06/04/2018     Priority: High     NSTEMI (non-ST elevated myocardial infarction) (H) 06/04/2018     Priority: High     Septic shock (H) 06/04/2018     Priority: High     Lactic acidosis 06/04/2018     Priority: High     Metabolic acidosis 06/04/2018     Priority: Medium     Acute kidney injury superimposed on CKD (H) 06/04/2018     Priority: Medium     Sinus tachycardia 06/04/2018     Priority: Medium     Sinus bradycardia 06/04/2018     Priority: Medium     Hematemesis - 10-->7 05/25/2018     Priority: Medium     Urinary retention 05/19/2018     Priority: Medium     Rupture of anterior cruciate ligament of left knee 05/17/2018     Priority: Medium     Nondisplaced fracture of medial condyle of femur (H) 05/17/2018     Priority: Medium     Mitral valve insufficiency, unspecified etiology 06/17/2016     Priority: Medium     Generalized muscle weakness 06/17/2016     Priority: Medium     Chronic combined systolic and diastolic congestive heart failure (H) 05/26/2016     Priority: Medium     Dry bnp 2000       Essential hypertension 05/19/2016     Priority: Medium     Coronary artery disease involving native coronary artery of native heart without angina pectoris 03/17/2012     Priority: Medium     Prostate cancer (H) 03/17/2012     Priority: Low     Anemia in chronic kidney disease 03/17/2012     Priority: Low     CKD (chronic kidney disease) stage 3, GFR 30-59 ml/min 03/17/2012     Priority: Low     Advance Care Planning 03/17/2012     Priority: Low     Advance Care Planning 3/29/2016: Receipt of ACP document:  Received: Health Care Directive which was witnessed or notarized on 10-12-15.  Document previously scanned on 2-26-16.   Validation form completed and sent to be scanned.  Code Status reflects choices in most recent ACP document and per documentation in ED Encounter by Dr Yuli bellejarad DNR/DNI dated 3-17-12.  Recommend goals of care discussion with patient/decision makers and POLST/ updated Code status order as applicable to reflect patient choices. .  Confirmed/documented designated decision maker(s).  Added by Annabelle Mackenzie RN Advance Care Planning Liaison with [employer]                 History of Present Illness   Copied from H&P:  Tristen Canela is a 89 year old male who presents from PACU with increased work of breathing, labile BPs, and sinus tachycardia alternating with sinus bradycardia s/p outpatient urologic procedure (dilatation of ureteral stricture) 06/04/18. Per discussion with Urologist Dr. Keyes, the patient had at least 850mL of retained urine in his grossly-distended bladder prior to the urologic procedure; the patient will need to keep an indwelling garsia in place for at least 3weeks' time after today's procedure. The patient received a total of 500mL of IV NS during the procedure. He was found to be persistently tachypneic to RR 35-40s for 2hours following the procedure. PACU nurses had difficulty obtaining reliable/accurate BP measurements and O2 sat measurements, as the readings appeared to vary erratically from one moment to the next. The patient's HR was initially found to be in the 50s, but then was found to spike up to the 140s. He remained in sinus rhythm on the telemetry monitor with PACs/PVCs. Approx 2 hours after the procedure, his HR was noted to be maintained either in the 90s or >100 (no further bradycardia was noted) with associated hypertension (SBP 160s-170). A single dose of his home oral metoprolol 12.5mg was given x1 in PACU with improvement in HR to 90s-120s.   Of note, the patient has hx prostate CA for which he has been receiving Lupron shots for many years.  Had extensive  bedside discussion of the above events with several of the patient's family members; discussed recommendations to admit the patient to the ICU for close monitoring and medical management overnight; family agreed and all questions were answered to their satisfaction. Family re-iterated the patient's wishes for DNR/DNI.       Hospital Course   Tristen Canela was admitted on 6/4/2018.  The following problems were addressed during his hospitalization:    Principal Problem:    Septic shock (H)  Active Problems:    Sepsis (H)    NSTEMI (non-ST elevated myocardial infarction) (H)    Lactic acidosis    Coronary artery disease involving native coronary artery of native heart without angina pectoris    Chronic combined systolic and diastolic congestive heart failure (H)    Mitral valve insufficiency, unspecified etiology    Essential hypertension    Urinary retention    Metabolic acidosis    Acute kidney injury superimposed on CKD (H)    Sinus tachycardia    Sinus bradycardia    Prostate cancer (H)    Anemia in chronic kidney disease    CKD (chronic kidney disease) stage 3, GFR 30-59 ml/min    Patient was admitted to the ICU from the PACU following a urological procedure with urethral dilatation.  During the procedure and afterwards he developed episodes of rapid heart rate followed by slower heart rate with up-and-down blood pressures and some tachypnea.  Laboratory evaluation was concerning for an elevated lactic acid and elevated troponin.  EKG showed some lateral ST depression suggesting that he might of had a NST JOHN either during or after surgery.  With lactic acidosis and subsequent metabolic acidosis there is also concern that he  was developing either cardiogenic or septic shock with a rise in his creatinine.  Blood pressures remained soft and were dropping, so he was started on pressor support.  Initially with Levophed and vasopressin was added.  Despite maximal dosing it was difficult to keep his MA P greater  than 65.  Anesthesia came and placed a central line.  They attempted to place an arterial line without success.   patient was placed on BiPAP support.  Family was consulted and they reiterated his desire to be DNR/DNI.  He was covered with broad-spectrum antibiotics including Zosyn and vancomycin after cultures of blood and urine were obtained.  He received some fluids including bicarbonate and a bicarb infusion but there was concern about potential fluid overload with his previous cardiac history.  Despite these measures he was worsening clinically as well as laboratory wise with worsening acidosis, developing hyperkalemia and worsening renal function.  Was not making urine.  In the end his heart rate became bradycardic and he ultimately  at 11:08 PM.   final cause of death will be septic shock.  Family was in attendance and requested that no autopsy be performed.  He will be released after discussion with   to  home.    Cause of death: Septic shock    Clive Guzman    Significant Results and Procedures   Lactic acidosis, elevated creatinine and troponin       Pending Results   Unresulted Labs Ordered in the Past 30 Days of this Admission     Date and Time Order Name Status Description    2018 0600 Hemoglobin In process     2018 1757 Procalcitonin In process     2018 1753 Respiratory Virus Panel by PCR In process     2018 1742 Methicillin resistant staph aureus cult In process     2018 1634 Blood culture In process     2018 1634 Blood culture In process     2018 1632 Urine Culture Aerobic Bacterial In process     2018 0948 RED BLOOD CELL PREPARE ORDER UNIT In process           Primary Care Physician   Luis Miguel Watson    Consultations This Hospital Stay   UROLOGY IP CONSULT  PHYSICAL THERAPY ADULT IP CONSULT  OCCUPATIONAL THERAPY ADULT IP CONSULT  SWALLOW EVAL SPEECH PATH AT BEDSIDE IP CONSULT  PHARMACY TO DOSE VANCO  PHARMACY IP CONSULT    Time Spent on this  Encounter   I, Clive Guzman, personally saw the patient today and spent greater than 30 minutes discharging this patient.    Data   Most Recent 3 CBC's:  Recent Labs   Lab Test  06/04/18   1620  05/31/18   0613  05/29/18   0625  05/24/18   0651   05/18/18   0651  03/26/18   1029   WBC  13.0*   --   14.6*  14.6*   --   13.0*  8.6   HGB  8.2*  8.4*  7.9*  7.2*   < >  10.4*  10.0*   MCV  100   --    --    --    --   93  98   PLT  442   --    --    --    --   294  299    < > = values in this interval not displayed.      Most Recent 3 BMP's:  Recent Labs   Lab Test  06/04/18   2150  06/04/18   1757  06/04/18   1620   NA  141  142  140   POTASSIUM  6.1*  5.7*  5.3   CHLORIDE  103  106  105   CO2  8*  14*  16*   BUN  42*  42*  41*   CR  2.68*  2.29*  2.15*   ANIONGAP  30*  22*  19*   ESTEPHANIE  8.0*  8.3*  8.2*   GLC  40*  80  155*     Most Recent 2 LFT's:  Recent Labs   Lab Test  06/04/18   1620  11/25/16   1354   AST  70*  17   ALT  51  16   ALKPHOS  129  81   BILITOTAL  0.8  0.2     Most Recent INR's and Anticoagulation Dosing History:  Anticoagulation Dose History     Recent Dosing and Labs Latest Ref Rng & Units 8/16/2001 3/17/2012    INR 0.86 - 1.14 1.0 1.09        Most Recent 3 Troponin's:  Recent Labs   Lab Test  06/04/18   2150 06/04/18   1620  03/17/12   1803   TROPI  5.571*  1.991*  11.600*     Most Recent Cholesterol Panel:  Recent Labs   Lab Test  03/20/18   1122   CHOL  210*   LDL  138*   HDL  50   TRIG  112     Most Recent 6 Bacteria Isolates From Any Culture (See EPIC Reports for Culture Details):  Recent Labs   Lab Test  05/24/18 2020 03/17/12   0656  03/24/10   1454   CULT  No growth  No MRSA isolated  No growth     Most Recent TSH, T4 and A1c Labs:  Recent Labs   Lab Test  04/20/15   1153   TSH  0.94     Results for orders placed or performed during the hospital encounter of 06/04/18   XR Chest Port 1 View    Narrative    PORTABLE CHEST ONE VIEW   6/4/2018 4:27 PM     HISTORY:  Desaturations.    COMPARISON: Chest x-rays dated 4/20/2015.    FINDINGS: There is a new left basilar opacity which could represent  pneumonia in the appropriate clinical setting. There is broad-based  extra convex curvature of the spine which is slightly accentuated  since the prior study and could be due to positioning of the patient.  Right lung remains clear. There are thoracic aortic calcifications.  Heart size is difficult to evaluate given the AP technique and given  the left basilar opacity. There is mild vascular congestion. There is  a possible left pleural fluid collection. No fracture. Diffuse  osteopenia and degenerative changes of the bilateral shoulders are  again noted.      Impression    IMPRESSION:   1. New left basilar opacity could represent atelectasis, infiltrate,  and/or effusion.  2. Mild vascular congestion. Cardiac silhouette is difficult to  evaluate given the left base opacity. Congestive heart failure is  difficult to exclude  3. Diffuse osteopenia and severe degenerative changes in the bilateral  shoulders are again noted.  4. Aortic atherosclerosis is again seen.    RIGO PATTON MD   XR Chest Port 1 View    Narrative    CHEST PORTABLE ONE VIEW     6/4/2018 7:32 PM     HISTORY: Status post TLC placement.    COMPARISON: Chest x-ray 6/4/2018.      Impression    IMPRESSION:  Portable view of the chest is performed. Right IJ central  venous line is noted and in good position with catheter tip in the mid  SVC. No pneumothorax. Retrocardiac consolidation is unchanged. Heart  size appears mildly prominent but stable. Aortic tortuosity and  calcification is unchanged. No evidence of pneumothorax or right  pleural effusion. Left pleural fluid cannot be excluded.     FAVIO HARVEY MD

## 2018-06-05 NOTE — PROGRESS NOTES
.S-(situation): Patient arrives to room 218 via cart from PACU.     B-(background): Respiratory Failure; Sepsis.     A-(assessment): Pt. Is only alert to self. Lethargic. Skin tear to right hand. Blanchable redness to buttocks. Some bruising noted throughout but otherwise intact. Sats 78% on oxymask upon arrival. Bipap initiated by RT upon admission with sats remaining in low-mid 80's. BP inconsistent and unstable.     R-(recommendations): Orders reviewed with wife, Tara. Will monitor patient per MD orders.     Inpatient nursing criteria listed below were met:    Health care directives status obtained and documented: Yes  SCD's Documented: Yes  Skin issues/needs documented:Yes  Isolation needs addressed, if appropriate: NA  Fall Prevention: Care plan updated, Education given and documented NA  MRSA swab completed for patient 55 years and older (exclude SHAMIKA and TKA): Yes  My Chart patient sign up addressed and documented: No  Care Plan initiated: Yes  Education Assessment documented:Yes  Education Documented (Pre-existing chronic infection such as, MRSA/VRE need education on admission): Yes  Admission Medication Reconciliation completed: Yes  New medication patient education completed and documented (Possible Side Effects of Common Medications handout): Yes  Home medications if not able to send immediately home with family stored here: NA  Reminder note placed in discharge instructions: NA  Discharge planning review completed (admission navigator) Yes

## 2018-06-05 NOTE — PROVIDER NOTIFICATION
DATE:  6/4/2018   TIME OF RECEIPT FROM LAB:  9105  LAB TEST:  Lactic acid  LAB VALUE:  20  RESULTS GIVEN WITH READ-BACK TO (PROVIDER):  Dr. Guzman  TIME LAB VALUE REPORTED TO PROVIDER:   4140

## 2018-06-06 LAB
BACTERIA SPEC CULT: ABNORMAL
BACTERIA SPEC CULT: ABNORMAL
SPECIMEN SOURCE: ABNORMAL

## 2018-06-10 LAB
BACTERIA SPEC CULT: NORMAL
BACTERIA SPEC CULT: NORMAL
SPECIMEN SOURCE: NORMAL
SPECIMEN SOURCE: NORMAL

## 2019-12-17 NOTE — IP AVS SNAPSHOT
Patient: Duane Hemphill    Procedure Summary     Date:  12/17/19 Room / Location:  [facility] OR  / [facility] MAIN OR    Anesthesia Start:  0847 Anesthesia Stop:  1407    Procedure:  LUMBAR 4 TO LUMBAR 5 OPEN DECOMPRESSION AND POSTERIOR LUMBAR INTERBODY FUSION WITH CAGE AND SEXTANT PEDICLE SCREW FIXATION (Bilateral Spine Lumbar) Diagnosis:       Lumbar radiculopathy      DDD (degenerative disc disease), lumbar      Chronic right hip pain      (Lumbar radiculopathy [M54.16])      (DDD (degenerative disc disease), lumbar [M51.36])      (Chronic right hip pain [M25.551, G89.29])    Surgeon:  Saurav Rasheed MD Provider:  Jun Gandhi MD    Anesthesia Type:  general ASA Status:  2          Anesthesia Type: general    Vitals  Vitals Value Taken Time   /92 12/17/2019  2:45 PM   Temp 36.4 °C (97.5 °F) 12/17/2019  2:04 PM   Pulse 80 12/17/2019  2:59 PM   Resp 18 12/17/2019  2:45 PM   SpO2 95 % 12/17/2019  2:59 PM   Vitals shown include unvalidated device data.        Post Anesthesia Care and Evaluation    Patient location during evaluation: PACU  Patient participation: complete - patient participated  Level of consciousness: awake and alert  Pain management: adequate  Airway patency: patent  Anesthetic complications: No anesthetic complications    Cardiovascular status: acceptable  Respiratory status: acceptable  Hydration status: acceptable    Comments: --------------------            12/17/19               1445     --------------------   BP:                  Pulse:               Resp:       18       Temp:                SpO2:               --------------------       ` `     [address] SURGICAL: [phone]                 INTERAGENCY TRANSFER FORM - NOTES (H&P, Discharge Summary, Consults, Procedures, Therapies)   2018                    Hospital Admission Date: 2018  DELVIN MEAD   : 1925  Sex: Male        Patient PCP Information     Provider PCP Type    Luis Miguel Watson MD General         History & Physicals      H&P by David Coelho MD at 2018  9:26 PM     Author:  David Coelho MD Service:  Hospitalist Author Type:  Physician    Filed:  2018  9:43 PM Date of Service:  2018  9:26 PM Creation Time:  2018  9:26 PM    Status:  Signed :  David Coelho MD (Physician)         [facility]    History and Physical  Hospitalist       Date of Admission:  2018  Date of Service (when I saw the patient): 18    Assessment & Plan       Active Problems:    Knee injury, left, initial encounter    Assessment: xray without any identifiable fractures.  He has an associated effusion and he may have a bad sprain.  He is unable to bear weight and not able to return home directly[DF1.1].[DF1.2]    Plan:[DF1.1] register to observation, ortho consult, PT and SS consult, tylenol for pain for now[DF1.2]      Coronary artery disease involving native coronary artery of native heart without angina pectoris    Assessment:[DF1.1] chronic and stable[DF1.2]    Plan:[DF1.1] continue home meds[DF1.2]      Chronic combined systolic and diastolic congestive heart failure (H)    Assessment:[DF1.1] controlled[DF1.2]    Plan:[DF1.1] continue home meds[DF1.2]      Mitral valve insufficiency, unspecified etiology    Assessment:[DF1.1] no signs of acute CHF[DF1.2]    Plan:[DF1.1] continue ACEI[DF1.2]      Benign essential hypertension    Assessment:[DF1.1] controlled[DF1.2]    Plan:[DF1.1] continue home meds[DF1.2]        Prostate cancer (H)    Assessment:[DF1.1] noted past history and  treated with Lupron[DF1.2]    Plan:[DF1.1] outpatient f/u[DF1.2]      Anemia in chronic kidney disease    Assessment:[DF1.1] chronic and stable with labs not repeated today[DF1.2]    Plan:[DF1.1] outpatient f/u[DF1.2]      CKD (chronic kidney disease) stage 3, GFR 30-59 ml/min    Assessment:[DF1.1] chronic[DF1.2]    Plan:[DF1.1] routine outpatient f/u[DF1.2]      # Pain Assessment:  Current Pain Score 5/17/2018   Pain score (0-10) 3[DF1.1]   - Tristen is experiencing pain due to left knee pain. Pain management was discussed and the plan was created in a collaborative fashion.  Tristen's response to the current recommendations: engaged  - Pharmacologic adjuvants: Acetaminophen[DF1.2]          DVT Prophylaxis:[DF1.1] anticipate less than 24 hour stay[DF1.2]  Code Status:[DF1.1] DNR / DNI[DF1.2]    Disposition: Expected discharge in[DF1.1] 1[DF1.2] days once[DF1.1] plan in place for safe disposition[DF1.2].    David Coelho MD    Primary Care Physician   Luis Miguel Watson    Chief Complaint[DF1.1]   Left knee pain    History is obtained from the patient[DF1.2]    History of Present Illness   Tristen Canela is a 89 year old male who presents with[DF1.1] left knee pain. He was mowing a ditch with his garden tractor when the tractor started tipping.  He was thrown off the  and the  righted itself.  He doesn't recall how he landed but he thinks the  tire may have run over his leg but he is not sure.  He had significant pain in his left knee and not able to bear weight.  He flagged down his mail person who along with another person were able to get him back onto the tractor and he drove home.  Once home however he was not able to bear weight so was then brought to the ED.[DF1.2]     Past Medical History    I have reviewed this patient's medical history and updated it with pertinent information if needed.   Past Medical History:   Diagnosis Date     Basal cell carcinoma 06/2012    right  nose lesion     Prostate cancer (H) 2010    [place] 6 (3+3)       Past Surgical History   I have reviewed this patient's surgical history and updated it with pertinent information if needed.  Past Surgical History:   Procedure Laterality Date     MOHS MICROGRAPHIC PROCEDURE  12    right nose lesion     PHACOEMULSIFICATION WITH STANDARD INTRAOCULAR LENS IMPLANT  2013    Procedure: PHACOEMULSIFICATION WITH STANDARD INTRAOCULAR LENS IMPLANT;  PHACOEMULSIFICATION WITH STANDARD INTRAOCULAR LENS IMPLANT RIGHT EYE;  Surgeon: Collin Booth MD;  Location: [facility] OR     TURP  08/26/10       Prior to Admission Medications   Prior to Admission Medications   Prescriptions Last Dose Informant Patient Reported? Taking?   LUPRON 5 MG/ML SC SOLN More than a month at Unknown time  No No   Si SHOT PER DR LARA EVERY FOUR MONTHS FOR PROSTATE CANCER   Multiple Vitamin (MULTI-VITAMIN) per tablet 2018 at 1900  Yes Yes   Sig: Take 1 tablet by mouth daily.   aspirin EC 81 MG EC tablet 2018 at 0700  Yes Yes   Sig: Take 81 mg by mouth daily   ferrous gluconate (FERGON) 324 (38 FE) MG tablet 2018 at 1900  No Yes   Sig: Take 1 tablet (324 mg) by mouth every other day   lisinopril (PRINIVIL/ZESTRIL) 5 MG tablet 2018 at 0700  No Yes   Sig: TAKE ONE-HALF TABLET BY MOUTH ONCE DAILY   metoprolol (LOPRESSOR) 25 MG tablet 2018 at 0700  No Yes   Sig: Take 0.5 tablets (12.5 mg) by mouth 2 times daily      Facility-Administered Medications: None     Allergies   Allergies   Allergen Reactions     No Known Drug Allergies        Social History   I have reviewed this patient's social history and updated it with pertinent information if needed. Tristen Canela  reports that he has never smoked. He has never used smokeless tobacco. He reports that he does not drink alcohol or use illicit drugs.    Family History   I have reviewed this patient's family history and updated it with pertinent information if  needed.   Family History   Problem Relation Age of Onset     OSTEOPOROSIS Mother      Asthma Maternal Grandmother      HEART DISEASE Maternal Grandmother      CHF     Prostate Cancer Brother      CANCER Sister      ovarian     CANCER Sister      derm     OSTEOPOROSIS Sister      OSTEOPOROSIS Sister        Review of Systems[DF1.1]   The 10 point Review of Systems is negative other than noted in the HPI or here.[DF1.2]     Physical Exam   Temp: 98  F (36.7  C) Temp src: Oral BP: 138/74 Pulse: 92   Resp: 16 SpO2: 95 %      Vital Signs with Ranges  Temp:  [98  F (36.7  C)] 98  F (36.7  C)  Pulse:  [] 92  Resp:  [14-16] 16  BP: (119-138)/(67-74) 138/74  SpO2:  [95 %] 95 %  152 lbs 0 oz[DF1.1]    Constitutional:   awake, alert, cooperative, no apparent distress, and appears stated age     Eyes:   Lids and lashes normal, pupils equal, round and reactive to light, extra ocular muscles intact, sclera clear, conjunctiva normal     ENT:   Normocephalic, without obvious abnormality, atraumatic, sinuses nontender on palpation, external ears without lesions, oral pharynx with moist mucous membranes, tonsils without erythema or exudates, gums normal and good dentition.     Neck:   Supple, symmetrical, trachea midline, no adenopathy, thyroid symmetric, not enlarged and no tenderness, skin normal     Hematologic / Lymphatic:   no cervical lymphadenopathy and no supraclavicular lymphadenopathy     Back:   Symmetric, no curvature, spinous processes are non-tender on palpation, paraspinous muscles are non-tender on palpation, no costal vertebral tenderness     Lungs:   No increased work of breathing, good air exchange, clear to auscultation bilaterally, no crackles or wheezing     Cardiovascular:   Normal apical impulse, regular rate and rhythm, normal S1 and S2, no S3 or S4, and 3/6 systolic murmur noted     Abdomen:   normal bowel sounds, soft, non-distended, non-tender, no masses palpated, no hepatosplenomegally      Musculoskeletal:   Moderate effusion of the left knee.  Abrasion noted over the patella.  No focal tenderness     Neurologic:   Awake, alert, oriented to name, place and time.  Cranial nerves II-XII are grossly intact.  Motor is 5 out of 5 bilaterally.   Sensory is intact.[DF1.2]        Data   Data reviewed today:  I personally reviewed[DF1.1] no images or EKG's today[DF1.2].  No lab results found in last 7 days.    Recent Results (from the past 24 hour(s))   XR Knee Left 3 Views    Narrative    KNEE LEFT THREE VIEWS    5/17/2018 5:56 PM     HISTORY: Knee pain after a fall.     COMPARISON: None.      Impression    IMPRESSION: Advanced medial and lateral compartment degenerative  changes. Moderate patellofemoral degenerative changes. Large joint  effusion. No evidence of acute fracture or subluxation. Vascular  calcifications typical of a diabetic patient.    JOSE R KUMAR MD[DF1.1]          Revision History        User Key Date/Time User Provider Type Action    > DF1.2 5/17/2018  9:43 PM David Coelho MD Physician Sign     DF1.1 5/17/2018  9:26 PM David Coelho MD Physician                      Discharge Summaries      Discharge Summaries by David Coelho MD at 5/18/2018  5:51 AM     Author:  David Coelho MD Service:  Hospitalist Author Type:  Physician    Filed:  5/18/2018  5:54 AM Date of Service:  5/18/2018  5:51 AM Creation Time:  5/18/2018  5:50 AM    Status:  Signed :  David Coelho MD (Physician)         [facility]    Discharge Summary  Hospitalist    Date of Admission:  5/17/2018  Date of Discharge:[DF1.1]  5/18/2018[DF1.2]  Discharging Provider:[DF1.1] David Coelho MD[DF1.2]  Date of Service (when I saw the patient):[DF1.1] 05/18/18    Discharge Diagnoses   Active Problems:    Knee injury, left, initial encounter    Coronary artery disease involving native coronary artery of native heart without angina pectoris     Chronic combined systolic and diastolic congestive heart failure (H)    Mitral valve insufficiency, unspecified etiology    Benign essential hypertension    Left knee pain    Prostate cancer (H)    Anemia in chronic kidney disease    CKD (chronic kidney disease) stage 3, GFR 30-59 ml/min       History of Present Illness[DF1.2]   Copied from H&P:   Tristen Canela is a 89 year old male who presents with left knee pain. He was mowing a ditch with his garden tractor when the tractor started tipping.  He was thrown off the  and the  righted itself.  He doesn't recall how he landed but he thinks the  tire may have run over his leg but he is not sure.  He had significant pain in his left knee and not able to bear weight.  He flagged down his mail person who along with another person were able to get him back onto the tractor and he drove home.  Once home however he was not able to bear weight so was then brought to the ED.    [DF1.1]    Hospital Course[DF1.2]   Tristen Canela was admitted on 5/17/2018.  The following problems were addressed during his hospitalization:    Active Problems:    Knee injury, left, initial encounter    Assessment: xrays did not reveal any acute fractures or dislocations.  Patient however unable to bear weight.  Pain controlled with tylenol.      Plan: discharge to rehab later today after being seen by PT and ortho and can f/u with GNP or PMD within one week      Coronary artery disease involving native coronary artery of native heart without angina pectoris    Assessment: chronic and stable    Plan: continue home meds      Chronic combined systolic and diastolic congestive heart failure (H)    Assessment: no signs of acute CHF    Plan: continue lisinopril      Mitral valve insufficiency, unspecified etiology    Assessment: no signs of acute CHF    Plan: continue lisinopril      Benign essential hypertension    Assessment: controlled    Plan: no change in  meds        Prostate cancer (H)    Assessment: noted past history    Plan: routine f/u with urology      Anemia in chronic kidney disease    Assessment: chronic and stable    Plan: routine outpatient recheck      CKD (chronic kidney disease) stage 3, GFR 30-59 ml/min    Assessment: chronic    Plan: routine outpatient recheck      # Discharge Pain Plan:   - During his hospitalization, Tristen experienced pain due to left knee pain.  The pain plan for discharge was discussed with Tristen and the plan was created in a collaborative fashion.    - Pharmacologic adjuvants:  Acetaminophen      David Coelho MD[DF1.1]    Significant Results and Procedures[DF1.2]   No procedures performed during this admission[DF1.1]    Pending Results[DF1.2]   These results will be followed up by[DF1.1]   Unresulted Labs Ordered in the Past 30 Days of this Admission     No orders found for last 61 day(s).          Code Status[DF1.2]   DNR / DNI[DF1.1]       Primary Care Physician   Luis Miguel Watson    Physical Exam   Temp: 98.3  F (36.8  C) Temp src: Oral BP: 128/68 Pulse: 87   Resp: 18 SpO2: 93 % O2 Device: None (Room air)    Vitals:    05/17/18 1715 05/17/18 2129   Weight: 68.9 kg (152 lb) 66.5 kg (146 lb 9.7 oz)[DF1.2]     Vital Signs with Ranges[DF1.1]  Temp:  [98  F (36.7  C)-98.3  F (36.8  C)] 98.3  F (36.8  C)  Pulse:  [] 87  Resp:  [14-18] 18  BP: (105-138)/(58-75) 128/68  SpO2:  [93 %-95 %] 93 %[DF1.2]       Lungs:  CTA throughout  CV:  RRR with 3/6 systolic  murmur  Abdomen: +BS, Soft, NT  Still with left knee effusion[DF1.1]      Discharge Disposition[DF1.2]   Discharged to rehabilitation facility  Condition at discharge: Stable[DF1.1]    Consultations This Hospital Stay   SOCIAL WORK IP CONSULT  PHYSICAL THERAPY ADULT IP CONSULT  ORTHOPEDIC SURGERY IP CONSULT  ORTHOPEDIC SURGERY IP CONSULT  PHYSICAL THERAPY ADULT IP CONSULT    Time Spent on this Encounter[DF1.2]   IDavid MD, personally saw the patient  today and spent less than or equal to 30 minutes discharging this patient.[DF1.1]    Discharge Orders     General info for SNF   Length of Stay Estimate: Short Term Care: Estimated # of Days <30  Condition at Discharge: Stable  Level of care:skilled   Rehabilitation Potential: Good  Admission H&P remains valid and up-to-date: Yes  Recent Chemotherapy: N/A  Use Nursing Home Standing Orders: Yes     Mantoux instructions   Give two-step Mantoux (PPD) Per Facility Policy Yes     Activity - Up with nursing assistance     Follow Up and recommended labs and tests   Follow up with GNP or PMD within one week     DNR/DNI     Physical Therapy Adult Consult   Evaluate and treat as clinically indicated.    Reason:  Left knee strain     Fall precautions     Advance Diet as Tolerated   Follow this diet upon discharge: Regular       Discharge Medications   Current Discharge Medication List      START taking these medications    Details   acetaminophen (TYLENOL) 325 MG tablet Take 2 tablets (650 mg) by mouth every 4 hours as needed for mild pain  Qty: 100 tablet    Associated Diagnoses: Knee injury, left, initial encounter         CONTINUE these medications which have CHANGED    Details   aspirin 81 MG EC tablet Take 1 tablet (81 mg) by mouth daily  Qty: 90 tablet, Refills: 1    Associated Diagnoses: Coronary artery disease involving native coronary artery of native heart without angina pectoris      ferrous gluconate (FERGON) 324 (38 Fe) MG tablet Take 1 tablet (324 mg) by mouth every other day  Qty: 100 tablet, Refills: 0    Associated Diagnoses: Low iron      lisinopril (PRINIVIL/ZESTRIL) 5 MG tablet TAKE ONE-HALF TABLET BY MOUTH ONCE DAILY  Qty: 45 tablet, Refills: 3    Associated Diagnoses: Benign essential hypertension      metoprolol tartrate (LOPRESSOR) 25 MG tablet Take 0.5 tablets (12.5 mg) by mouth 2 times daily  Qty: 90 tablet, Refills: 3    Associated Diagnoses: Congestive heart failure, unspecified congestive heart  "failure chronicity, unspecified congestive heart failure type (H)         CONTINUE these medications which have NOT CHANGED    Details   LUPRON 5 MG/ML SC SOLN 1 SHOT PER DR LARA EVERY FOUR MONTHS FOR PROSTATE CANCER  Qty: 1 mL, Refills: 0    Associated Diagnoses: Prostate cancer (H)         STOP taking these medications       Multiple Vitamin (MULTI-VITAMIN) per tablet Comments:   Reason for Stopping:             Allergies   Allergies   Allergen Reactions     No Known Drug Allergies      Data[DF1.2]              Revision History        User Key Date/Time User Provider Type Action    > DF1.2 5/18/2018  5:54 AM David Coelho MD Physician Sign     DF1.1 5/18/2018  5:50 AM David Coelho MD Physician                      Consult Notes      Consults signed by Eusebio Grove DO at 5/18/2018 12:19 PM      Author:  Eusebio Grove DO Service:  Orthopedics Author Type:  Physician    Filed:  5/18/2018 12:19 PM Date of Service:  5/18/2018 11:51 AM Creation Time:  5/18/2018 12:10 PM    Status:  Signed :  Eusebio Grove DO (Physician)         Consult Date:  05/18/2018      DATE OF CONSULTATION:  05/18/2018      REQUESTING PHYSICIAN:  David Evans MD      REASON FOR CONSULTATION:  Knee pain.        HISTORY OF PRESENT ILLNESS:  This is a 89-year-old male who was mowing the lawn in a ditch.  He was \"dumped off\" the lawnmower landing on his left side.  The lawnmower did not run over him or hit him.  However, he had significant left knee pain and was unable to stand up.  Subsequently evaluated in the ER.  He had radiographs, which ruled out fracture.  He is currently evaluated on the second floor.  Still having an inability to bear weight.  He reports no preexisting knee issues.  Wife is at the bedside.  Otherwise, denies injuries.      PAST MEDICAL HISTORY:  Includes coronary artery disease, mitral valve insufficiency, congestive heart failure, hypertension, prostate cancer, " chronic kidney disease, basal cell carcinoma.      PAST SURGICAL HISTORY:  Includes cataracts, TURP.      FAMILY HISTORY:  Includes a sister with ovarian cancer.  Grandmother with heart disease.      SOCIAL HISTORY:  No tobacco, EtOH or illicits.  Lives with his wife.      MEDICATIONS:  Include aspirin, iron, lisinopril, Lupron, metoprolol.      REVIEW OF SYSTEMS: A 10-point review of systems was performed and otherwise unremarkable, as per HPI.      PHYSICAL EXAMINATION:   GENERAL:  He is awake, alert, in no acute distress.  He is nontoxic appearing.  He is conversant.  He is pleasant.   VITAL SIGNS:  Temperature is 97.6, 98 pulse, 150/90 blood pressure, 18 respiratory rate with a 99% room air sat.     EXTREMITIES:  Focused exam of the left lower extremity showed the overlying skin to be intact with no excoriation, erythema or breakdown.  He has a large knee effusion with tenderness globally around the knee including along the patella, particularly the quad tendon area.  He has no specific distal pain.  He has no peripheral edema.  Distal neurovascular is intact.  He has no gross collateral instability, although he is unable to perform a straight leg.  Unable to perform any active leg extension.  Passively, he tolerates motion from approximately 7-40 degrees.  There is no evidence of infection.  The compartments are soft and compressible.      IMAGING:  Radiographs reviewed of the left knee, nonweightbearing, show extensive degenerative changes with bone-on-bone arthritis throughout.  There are no apparent fractures or dislocations.      IMPRESSION:  This is a 89-year-old male who has underlying left knee arthritis with possible extensor mechanism disruption.      PLAN:  I discussed it with him and his wife.  I have recommended an MRI to rule out extensor mechanism disruption, particularly his quad tendon.  Plan to keep him nonweightbearing in the meantime, elevate and ice.  Hopefully, he will obtain the MRI today.   I did review the case with Dr. Melgar.         DOROTHY BLUE DO             D: 2018   T: 2018   MT: NTS      Name:     DELVIN MEAD   MRN:      [id number]        Account:       [id number]   :      1925           Consult Date:  2018      Document: [id number][JL1.1]         Revision History        User Key Date/Time User Provider Type Action    > JL1.1 2018 12:19 PM Dorothy Blue DO Physician Sign     [N/A] 2018 12:10 PM Dorothy Blue DO Physician Edit            Consults by Dorothy Blue DO at 2018 11:34 AM     Author:  Dorothy Blue DO Service:  Orthopedics Author Type:  Physician    Filed:  2018 11:51 AM Date of Service:  2018 11:34 AM Creation Time:  2018 11:32 AM    Status:  Addendum :  Dorothy Blue DO (Physician)     Consult Orders:    1. Orthopedic Surgery IP Consult: Patient to be seen: Routine - within 24 hours; traumatic knee injury; Consultant may enter orders: Yes [[id number]] ordered by David Evans MD at 18                IMP:  Left knee osteoarthritis-possible extensor mechanism disruption    Plan:  Unable to perform straight leg raise or actively extend lower leg, recommend MRI of left knee to rule out extensor mechanism disruption  Keep nwb   Elevate and ice.  Further plan pending MRI.  D/w Dr melgar.  Full consult:[JL1.1][id number][JL1.2]    Wilmer Blue D.O.[JL1.1]     Revision History        User Key Date/Time User Provider Type Action    > JL1.2 2018 11:51 AM Dorothy Blue DO Physician Addend     JL1.1 2018 11:34 AM Dorothy Blue DO Physician Sign                     Progress Notes - Physician (Notes from 18 through 18)      Progress Notes by Dorothy Blue DO at 2018  8:21 AM     Author:  Dorothy Blue DO Service:  Orthopedics Author Type:  Physician    Filed:  2018  8:42 AM  Date of Service:  5/19/2018  8:21 AM Creation Time:  5/19/2018  8:21 AM    Status:  Addendum :  Eusebio Grove DO (Physician)         Feeling better, wife/family at bedside. Obtained MRI last evening-radiology reading pending.    Exam:  AxO 4 in nad  Left knee moderate effusion, able to flex knee better today. Tenderness better along supra-patellar area.  Dnvi.    MRI: arthritis throughout. Macerated tears of meniscus. Extensor mechanism intact    PLAN:  Stable for discharge  Knee immobilizer when up  wbat   Follow up with me in 1 week.  Ice   Elevate.    Wilmer Grove[JL1.1]    Addendum:  Official read possible acute vs subacute non displaced medial femoral condyle fracture[JL1.2]. With indistinct margins will treat as contusion.[JL1.3]   Family[JL1.2] and[JL1.3] updated.[JL1.2] wbat but make sure to advance activity only as pain improves.[JL1.3]   Continue current plan.[JL1.4]     Revision History        User Key Date/Time User Provider Type Action    > JL1.3 5/19/2018  8:42 AM Eusebio Grove DO Physician Addend     JL1.4 5/19/2018  8:37 AM Eusebio Grove DO Physician Addend     JL1.2 5/19/2018  8:33 AM Eusebio Grove DO Physician Addend     JL1.1 5/19/2018  8:25 AM Eusebio Grove DO Physician Sign            Progress Notes by Janice Georges RN at 5/18/2018  6:45 PM     Author:  Janice Georges RN Service:  (none) Author Type:  Registered Nurse    Filed:  5/18/2018  7:58 PM Date of Service:  5/18/2018  6:45 PM Creation Time:  5/18/2018  6:45 PM    Status:  Addendum :  Janice Georges RN (Registered Nurse)         S-(situation): urinary retention    B-(background): fall at home    A-(assessment): Bladder scanned for 675, pt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
